# Patient Record
Sex: MALE | Race: BLACK OR AFRICAN AMERICAN | NOT HISPANIC OR LATINO
[De-identification: names, ages, dates, MRNs, and addresses within clinical notes are randomized per-mention and may not be internally consistent; named-entity substitution may affect disease eponyms.]

---

## 2018-04-18 ENCOUNTER — APPOINTMENT (OUTPATIENT)
Dept: ORTHOPEDIC SURGERY | Facility: CLINIC | Age: 68
End: 2018-04-18
Payer: MEDICARE

## 2018-04-18 VITALS — HEIGHT: 75 IN | BODY MASS INDEX: 26.86 KG/M2 | WEIGHT: 216 LBS | RESPIRATION RATE: 16 BRPM

## 2018-04-18 PROBLEM — Z00.00 ENCOUNTER FOR PREVENTIVE HEALTH EXAMINATION: Status: ACTIVE | Noted: 2018-04-18

## 2018-04-18 PROCEDURE — 76882 US LMTD JT/FCL EVL NVASC XTR: CPT | Mod: LT

## 2018-04-18 PROCEDURE — 99204 OFFICE O/P NEW MOD 45 MIN: CPT | Mod: 25

## 2018-04-26 ENCOUNTER — APPOINTMENT (OUTPATIENT)
Age: 68
End: 2018-04-26
Payer: MEDICARE

## 2018-04-26 PROCEDURE — 27654 REPAIR OF ACHILLES TENDON: CPT | Mod: LT

## 2018-05-04 ENCOUNTER — APPOINTMENT (OUTPATIENT)
Dept: ORTHOPEDIC SURGERY | Facility: CLINIC | Age: 68
End: 2018-05-04
Payer: MEDICARE

## 2018-05-04 VITALS — BODY MASS INDEX: 26.86 KG/M2 | HEIGHT: 75 IN | RESPIRATION RATE: 16 BRPM | WEIGHT: 216 LBS

## 2018-05-04 PROCEDURE — 99024 POSTOP FOLLOW-UP VISIT: CPT

## 2018-05-14 ENCOUNTER — APPOINTMENT (OUTPATIENT)
Dept: ORTHOPEDIC SURGERY | Facility: CLINIC | Age: 68
End: 2018-05-14
Payer: MEDICARE

## 2018-05-14 VITALS — BODY MASS INDEX: 26.86 KG/M2 | RESPIRATION RATE: 16 BRPM | WEIGHT: 216 LBS | HEIGHT: 75 IN

## 2018-05-14 PROCEDURE — 99024 POSTOP FOLLOW-UP VISIT: CPT

## 2018-05-14 RX ORDER — DOCUSATE SODIUM 100 MG/1
100 CAPSULE ORAL TWICE DAILY
Qty: 28 | Refills: 0 | Status: DISCONTINUED | COMMUNITY
Start: 2018-04-25 | End: 2018-05-14

## 2018-05-18 ENCOUNTER — APPOINTMENT (OUTPATIENT)
Dept: ORTHOPEDIC SURGERY | Facility: CLINIC | Age: 68
End: 2018-05-18
Payer: MEDICARE

## 2018-05-18 PROCEDURE — 99024 POSTOP FOLLOW-UP VISIT: CPT

## 2018-05-24 ENCOUNTER — LABORATORY RESULT (OUTPATIENT)
Age: 68
End: 2018-05-24

## 2018-05-25 ENCOUNTER — LABORATORY RESULT (OUTPATIENT)
Age: 68
End: 2018-05-25

## 2018-05-25 ENCOUNTER — APPOINTMENT (OUTPATIENT)
Dept: ORTHOPEDIC SURGERY | Facility: CLINIC | Age: 68
End: 2018-05-25
Payer: MEDICARE

## 2018-05-25 VITALS — HEIGHT: 75 IN | WEIGHT: 216 LBS | BODY MASS INDEX: 26.86 KG/M2 | RESPIRATION RATE: 16 BRPM

## 2018-05-25 PROCEDURE — 99024 POSTOP FOLLOW-UP VISIT: CPT

## 2018-05-30 ENCOUNTER — INPATIENT (INPATIENT)
Facility: HOSPITAL | Age: 68
LOS: 0 days | Discharge: HOME | DRG: 857 | End: 2018-05-31
Attending: ORTHOPAEDIC SURGERY | Admitting: ORTHOPAEDIC SURGERY
Payer: MEDICARE

## 2018-05-30 ENCOUNTER — APPOINTMENT (OUTPATIENT)
Dept: ORTHOPEDIC SURGERY | Facility: CLINIC | Age: 68
End: 2018-05-30
Payer: MEDICARE

## 2018-05-30 ENCOUNTER — APPOINTMENT (OUTPATIENT)
Dept: ORTHOPEDIC SURGERY | Facility: HOSPITAL | Age: 68
End: 2018-05-30

## 2018-05-30 VITALS
SYSTOLIC BLOOD PRESSURE: 122 MMHG | TEMPERATURE: 98 F | OXYGEN SATURATION: 99 % | HEART RATE: 59 BPM | DIASTOLIC BLOOD PRESSURE: 78 MMHG | RESPIRATION RATE: 16 BRPM

## 2018-05-30 VITALS — BODY MASS INDEX: 26.86 KG/M2 | HEIGHT: 75 IN | WEIGHT: 216 LBS | RESPIRATION RATE: 16 BRPM

## 2018-05-30 DIAGNOSIS — D64.9 ANEMIA, UNSPECIFIED: ICD-10-CM

## 2018-05-30 DIAGNOSIS — T81.4XXA INFECTION FOLLOWING A PROCEDURE, INITIAL ENCOUNTER: ICD-10-CM

## 2018-05-30 DIAGNOSIS — Z98.890 OTHER SPECIFIED POSTPROCEDURAL STATES: Chronic | ICD-10-CM

## 2018-05-30 DIAGNOSIS — Z01.818 ENCOUNTER FOR OTHER PREPROCEDURAL EXAMINATION: ICD-10-CM

## 2018-05-30 LAB
ALBUMIN SERPL ELPH-MCNC: 3.7 G/DL — SIGNIFICANT CHANGE UP (ref 3.4–5)
ALP SERPL-CCNC: 70 U/L — SIGNIFICANT CHANGE UP (ref 40–120)
ALT FLD-CCNC: 30 U/L — SIGNIFICANT CHANGE UP (ref 12–42)
ANION GAP SERPL CALC-SCNC: 10 MMOL/L — SIGNIFICANT CHANGE UP (ref 9–16)
APPEARANCE UR: CLEAR — SIGNIFICANT CHANGE UP
APTT BLD: 36.1 SEC — SIGNIFICANT CHANGE UP (ref 27.5–36.5)
AST SERPL-CCNC: 24 U/L — SIGNIFICANT CHANGE UP (ref 15–37)
BASOPHILS NFR BLD AUTO: 1.2 % — SIGNIFICANT CHANGE UP (ref 0–2)
BILIRUB SERPL-MCNC: 0.4 MG/DL — SIGNIFICANT CHANGE UP (ref 0.2–1.2)
BILIRUB UR-MCNC: NEGATIVE — SIGNIFICANT CHANGE UP
BLD GP AB SCN SERPL QL: NEGATIVE — SIGNIFICANT CHANGE UP
BUN SERPL-MCNC: 17 MG/DL — SIGNIFICANT CHANGE UP (ref 7–23)
CALCIUM SERPL-MCNC: 9.5 MG/DL — SIGNIFICANT CHANGE UP (ref 8.5–10.5)
CHLORIDE SERPL-SCNC: 106 MMOL/L — SIGNIFICANT CHANGE UP (ref 96–108)
CO2 SERPL-SCNC: 24 MMOL/L — SIGNIFICANT CHANGE UP (ref 22–31)
COLOR SPEC: YELLOW — SIGNIFICANT CHANGE UP
CREAT SERPL-MCNC: 1.3 MG/DL — SIGNIFICANT CHANGE UP (ref 0.5–1.3)
CRP SERPL-MCNC: 1 MG/DL — HIGH (ref 0–0.9)
DIFF PNL FLD: NEGATIVE — SIGNIFICANT CHANGE UP
EOSINOPHIL NFR BLD AUTO: 1 % — SIGNIFICANT CHANGE UP (ref 0–6)
GLUCOSE SERPL-MCNC: 104 MG/DL — HIGH (ref 70–99)
GLUCOSE UR QL: NEGATIVE — SIGNIFICANT CHANGE UP
HCT VFR BLD CALC: 36.4 % — LOW (ref 39–50)
HGB BLD-MCNC: 12.3 G/DL — LOW (ref 13–17)
IMM GRANULOCYTES NFR BLD AUTO: 0.6 % — SIGNIFICANT CHANGE UP (ref 0–1.5)
INR BLD: 1.15 — SIGNIFICANT CHANGE UP (ref 0.88–1.16)
KETONES UR-MCNC: NEGATIVE — SIGNIFICANT CHANGE UP
LEUKOCYTE ESTERASE UR-ACNC: NEGATIVE — SIGNIFICANT CHANGE UP
LYMPHOCYTES # BLD AUTO: 37.4 % — SIGNIFICANT CHANGE UP (ref 13–44)
MCHC RBC-ENTMCNC: 24.6 PG — LOW (ref 27–34)
MCHC RBC-ENTMCNC: 33.8 G/DL — SIGNIFICANT CHANGE UP (ref 32–36)
MCV RBC AUTO: 72.9 FL — LOW (ref 80–100)
MONOCYTES NFR BLD AUTO: 5.7 % — SIGNIFICANT CHANGE UP (ref 2–14)
NEUTROPHILS NFR BLD AUTO: 54.1 % — SIGNIFICANT CHANGE UP (ref 43–77)
NITRITE UR-MCNC: NEGATIVE — SIGNIFICANT CHANGE UP
PH UR: 6 — SIGNIFICANT CHANGE UP (ref 5–8)
PLATELET # BLD AUTO: 291 K/UL — SIGNIFICANT CHANGE UP (ref 150–400)
POTASSIUM SERPL-MCNC: 4.3 MMOL/L — SIGNIFICANT CHANGE UP (ref 3.5–5.3)
POTASSIUM SERPL-SCNC: 4.3 MMOL/L — SIGNIFICANT CHANGE UP (ref 3.5–5.3)
PROT SERPL-MCNC: 8 G/DL — SIGNIFICANT CHANGE UP (ref 6.4–8.2)
PROT UR-MCNC: NEGATIVE MG/DL — SIGNIFICANT CHANGE UP
PROTHROM AB SERPL-ACNC: 12.7 SEC — SIGNIFICANT CHANGE UP (ref 9.8–12.7)
RBC # BLD: 4.99 M/UL — SIGNIFICANT CHANGE UP (ref 4.2–5.8)
RBC # FLD: 13.6 % — SIGNIFICANT CHANGE UP (ref 10.3–16.9)
RH IG SCN BLD-IMP: POSITIVE — SIGNIFICANT CHANGE UP
SODIUM SERPL-SCNC: 140 MMOL/L — SIGNIFICANT CHANGE UP (ref 132–145)
SP GR SPEC: >=1.03 — SIGNIFICANT CHANGE UP (ref 1–1.03)
UROBILINOGEN FLD QL: 0.2 E.U./DL — SIGNIFICANT CHANGE UP
WBC # BLD: 6.7 K/UL — SIGNIFICANT CHANGE UP (ref 3.8–10.5)
WBC # FLD AUTO: 6.7 K/UL — SIGNIFICANT CHANGE UP (ref 3.8–10.5)

## 2018-05-30 PROCEDURE — 93010 ELECTROCARDIOGRAM REPORT: CPT

## 2018-05-30 PROCEDURE — 71045 X-RAY EXAM CHEST 1 VIEW: CPT | Mod: 26

## 2018-05-30 PROCEDURE — 10180 I&D COMPLEX PO WOUND INFCTJ: CPT | Mod: 78

## 2018-05-30 PROCEDURE — 99284 EMERGENCY DEPT VISIT MOD MDM: CPT

## 2018-05-30 PROCEDURE — 99221 1ST HOSP IP/OBS SF/LOW 40: CPT

## 2018-05-30 PROCEDURE — 97605 NEG PRS WND THER DME<=50SQCM: CPT

## 2018-05-30 PROCEDURE — 11043 DBRDMT MUSC&/FSCA 1ST 20/<: CPT | Mod: 78

## 2018-05-30 PROCEDURE — 99024 POSTOP FOLLOW-UP VISIT: CPT

## 2018-05-30 RX ORDER — DOCUSATE SODIUM 100 MG
100 CAPSULE ORAL THREE TIMES A DAY
Qty: 0 | Refills: 0 | Status: DISCONTINUED | OUTPATIENT
Start: 2018-05-30 | End: 2018-05-31

## 2018-05-30 RX ORDER — MORPHINE SULFATE 50 MG/1
4 CAPSULE, EXTENDED RELEASE ORAL EVERY 4 HOURS
Qty: 0 | Refills: 0 | Status: DISCONTINUED | OUTPATIENT
Start: 2018-05-30 | End: 2018-05-31

## 2018-05-30 RX ORDER — ACETAMINOPHEN 500 MG
650 TABLET ORAL EVERY 6 HOURS
Qty: 0 | Refills: 0 | Status: DISCONTINUED | OUTPATIENT
Start: 2018-05-30 | End: 2018-05-31

## 2018-05-30 RX ORDER — FAMOTIDINE 10 MG/ML
20 INJECTION INTRAVENOUS EVERY 12 HOURS
Qty: 0 | Refills: 0 | Status: DISCONTINUED | OUTPATIENT
Start: 2018-05-30 | End: 2018-05-31

## 2018-05-30 RX ORDER — VANCOMYCIN HCL 1 G
1500 VIAL (EA) INTRAVENOUS ONCE
Qty: 0 | Refills: 0 | Status: COMPLETED | OUTPATIENT
Start: 2018-05-31 | End: 2018-05-31

## 2018-05-30 RX ORDER — OXYCODONE HYDROCHLORIDE 5 MG/1
5 TABLET ORAL EVERY 4 HOURS
Qty: 0 | Refills: 0 | Status: DISCONTINUED | OUTPATIENT
Start: 2018-05-30 | End: 2018-05-31

## 2018-05-30 RX ORDER — MAGNESIUM HYDROXIDE 400 MG/1
30 TABLET, CHEWABLE ORAL DAILY
Qty: 0 | Refills: 0 | Status: DISCONTINUED | OUTPATIENT
Start: 2018-05-30 | End: 2018-05-31

## 2018-05-30 RX ORDER — SODIUM CHLORIDE 9 MG/ML
1000 INJECTION, SOLUTION INTRAVENOUS
Qty: 0 | Refills: 0 | Status: DISCONTINUED | OUTPATIENT
Start: 2018-05-30 | End: 2018-05-31

## 2018-05-30 RX ORDER — SODIUM CHLORIDE 9 MG/ML
1000 INJECTION INTRAMUSCULAR; INTRAVENOUS; SUBCUTANEOUS
Qty: 0 | Refills: 0 | Status: DISCONTINUED | OUTPATIENT
Start: 2018-05-30 | End: 2018-05-31

## 2018-05-30 RX ORDER — ASPIRIN/CALCIUM CARB/MAGNESIUM 324 MG
325 TABLET ORAL
Qty: 0 | Refills: 0 | Status: DISCONTINUED | OUTPATIENT
Start: 2018-05-30 | End: 2018-05-31

## 2018-05-30 RX ORDER — OXYCODONE HYDROCHLORIDE 5 MG/1
10 TABLET ORAL EVERY 4 HOURS
Qty: 0 | Refills: 0 | Status: DISCONTINUED | OUTPATIENT
Start: 2018-05-30 | End: 2018-05-31

## 2018-05-30 RX ORDER — METOCLOPRAMIDE HCL 10 MG
10 TABLET ORAL EVERY 6 HOURS
Qty: 0 | Refills: 0 | Status: DISCONTINUED | OUTPATIENT
Start: 2018-05-30 | End: 2018-05-31

## 2018-05-30 RX ORDER — ZALEPLON 10 MG
5 CAPSULE ORAL AT BEDTIME
Qty: 0 | Refills: 0 | Status: DISCONTINUED | OUTPATIENT
Start: 2018-05-30 | End: 2018-05-31

## 2018-05-30 RX ORDER — ONDANSETRON 8 MG/1
4 TABLET, FILM COATED ORAL EVERY 6 HOURS
Qty: 0 | Refills: 0 | Status: DISCONTINUED | OUTPATIENT
Start: 2018-05-30 | End: 2018-05-31

## 2018-05-30 RX ADMIN — MORPHINE SULFATE 4 MILLIGRAM(S): 50 CAPSULE, EXTENDED RELEASE ORAL at 22:32

## 2018-05-30 RX ADMIN — OXYCODONE HYDROCHLORIDE 5 MILLIGRAM(S): 5 TABLET ORAL at 21:13

## 2018-05-30 RX ADMIN — SODIUM CHLORIDE 100 MILLILITER(S): 9 INJECTION INTRAMUSCULAR; INTRAVENOUS; SUBCUTANEOUS at 20:57

## 2018-05-30 RX ADMIN — Medication 100 MILLIGRAM(S): at 22:10

## 2018-05-30 RX ADMIN — SODIUM CHLORIDE 100 MILLILITER(S): 9 INJECTION, SOLUTION INTRAVENOUS at 22:10

## 2018-05-30 RX ADMIN — MORPHINE SULFATE 4 MILLIGRAM(S): 50 CAPSULE, EXTENDED RELEASE ORAL at 22:44

## 2018-05-30 RX ADMIN — OXYCODONE HYDROCHLORIDE 5 MILLIGRAM(S): 5 TABLET ORAL at 22:11

## 2018-05-30 NOTE — H&P ADULT - NSHPPHYSICALEXAM_GEN_ALL_CORE
Gen: NAD, A&Ox3  MSK: Skin warm and well perfused; no erythema or excess warmth appreciated; proximal achilles repair surgical sites of posterior left lower extremity slightly macerated with scant serosanguinous drainage on overlying gauze. Distal heel nylon sutures in place. EHL/FHL/TA/GS 5/5 motor strength bilateral lower extremities. SLT in tact to distal left lower extremity; DP/PT pulses 2+.

## 2018-05-30 NOTE — ED PROVIDER NOTE - SKIN, MLM
2 surgical incisions noted in L posterior ankle, 2 surgical incisions noted in L posterior ankle, mild erythema, no suppuration, decreased rom due to pain.

## 2018-05-30 NOTE — ED PROVIDER NOTE - MEDICAL DECISION MAKING DETAILS
admission labs ordered including CRP, Sed rate as requested by Dr Dexter prior to admission. To be admitted to Dr Dexter's service for further work up given suspicion for surgical site infection. Hold IV abxs until seen by Ortho. Pt already took his bactrim dose today.

## 2018-05-30 NOTE — H&P ADULT - HISTORY OF PRESENT ILLNESS
67M no significant pmhx presents with left achilles tendon infection x 2 weeks. Patient had left achilles tendon repair outpatient approximately 4 weeks ago. Patient states he had his first post operative appointment approximately 2 weeks later and had not changed his dressing until he was seen that time - patient states he was told on this visit that he had a possible wound infection. Patient was given Bactrim x 2 weeks and outpatient cultures were taken resulting + MSSA. Patient has been ambulating painlessly and without assistance. He denies fevers and chills at home. He endorses some drainage from proximal surgical site. Denies numbness/tingling/weakness of bilateral lower extremities and denies pain. 67M no significant pmhx s/p achilles repair 4 weeks ago. Post operatively he developed cellulitis and was placed on Bactrim. He subsequently developed drainage and was recommended for urgent I&D on multiple occasions, with explanation that it is unlikely that this will resolve with isolated oral antibiotics. He continued to defer until re-evaluation on 5/30 in which he hand continued erythema, fibrinous drainage, pain, and evidence that his achilles repair was not intact with decreased resting tension of the gastroc soleous complex.  presents with left achilles tendon infection x 2 weeks. Patient had left achilles tendon repair outpatient approximately 4 weeks ago. Patient states he had his first post operative appointment approximately 2 weeks later and had not changed his dressing until he was seen that time - patient states he was told on this visit that he had a possible wound infection. Patient was given Bactrim x 2 weeks and outpatient cultures were taken resulting + MSSA. Patient has been ambulating painlessly and without assistance. He denies fevers and chills at home. He endorses some drainage from proximal surgical site. Denies numbness/tingling/weakness of bilateral lower extremities and denies pain. 67M no significant pmhx s/p achilles repair 4 weeks ago. Post operatively he developed cellulitis and was placed on Bactrim. He subsequently developed drainage and was recommended for urgent I&D on multiple occasions, with explanation that it is unlikely that this will resolve with isolated oral antibiotics. He continued to defer until re-evaluation on 5/30 in which he hand continued erythema, fibrinous drainage, pain, and evidence that his achilles repair was not intact with decreased resting tension of the gastroc soleous complex. A sterile field was prepared within the office and a culture of the drainage was performed which grew out MSSA. Extensive discussion of the risks and benifits was undertaken with the patient and his wife. + MSSA. He denies fevers and chills at home. He endorses some drainage from proximal surgical site. Denies numbness/tingling/weakness of bilateral lower extremities and denies pain.

## 2018-05-30 NOTE — ED PROVIDER NOTE - OBJECTIVE STATEMENT
67 male pt, no hx of med problems. Sent in by Orthopedics Dr Dexter from his office of admission for surgical site infection. Pt had achilles tendon surgery 4 weeks ago and has had suppuration for 2 weeks. Started on abxs w no improvement. Has completed 2 weeks of abxs (bactrim). denies fever, chills or any other symptoms.

## 2018-05-30 NOTE — CONSULT NOTE ADULT - SUBJECTIVE AND OBJECTIVE BOX
Patient is a 68 yo male no PMH who was admitted for I&D of left achilles. Patient stated he injured his left achilles tendon 4 weeks ago after hitting the back of his foot against the stair, seen in Urgent care was put on special boots. He saw Dr Martin, Orthopedic, the next day and underwent Left achilles tendon repair. Over the next 2 weeks, his surgical wound became more swollen and draining pus, he had his suture removed and started on Bactrim 2 weeks ago. However the wound never completely healed so he presented today for I&D. Patient stated he can still ambulate well, no pain with ambulation. No past medical history of cardiac, no kidney problem, no DM, no CVA. He is active and can walk up and down many stairs without problem.    Pt examined at bedside.   Denies fever/chill, no chest pain, no SOB, no pain anywhere else.    V/S    T(F): 97.7 (05-30-18 @ 17:22), Max: 98.1 (05-30-18 @ 12:58)  HR: 57 (05-30-18 @ 17:22)  BP: 145/82 (05-30-18 @ 17:22)  RR: 17 (05-30-18 @ 17:22)  SpO2: 100% (05-30-18 @ 17:22)  Wt(kg): --  PE     GEN - Appears stated age. No distress.           HEENT - NCAT, EOMI, PERRLA, MMM           CVS - RRR, no M/R/G, no JVD           PULM - CTA B/L, equal air entry, no increased work of breathing           ABD - Soft, nontender, nondistended, normal BS           EXT - Distal extremities warm, no cyanosis, no edema, open wound on posterior left heel, no drainage noted, some erythema.           NEURO - AOx3, Moves all extremities.     LAB                        12.3   6.7   )-----------( 291      ( 30 May 2018 11:21 )             36.4               05-30    140  |  106  |  17  ----------------------------<  104<H>  4.3   |  24  |  1.30    Ca    9.5      30 May 2018 11:21    TPro  8.0  /  Alb  3.7  /  TBili  0.4  /  DBili  x   /  AST  24  /  ALT  30  /  AlkPhos  70  05-30              PT/INR - ( 30 May 2018 11:21 )   PT: 12.7 sec;   INR: 1.15          PTT - ( 30 May 2018 11:21 )  PTT:36.1 sec            LIVER FUNCTIONS - ( 30 May 2018 11:21 )  Alb: 3.7 g/dL / Pro: 8.0 g/dL / ALK PHOS: 70 U/L / ALT: 30 U/L / AST: 24 U/L / GGT: x             Additional tests & radiology reviewed.

## 2018-05-30 NOTE — PROGRESS NOTE ADULT - SUBJECTIVE AND OBJECTIVE BOX
Ortho Post Op Check    Procedure: Achilles I&D LLE   Surgeon: Isacc     Pt comfortable without complaints, pain controlled  Denies CP, SOB, N/V, numbness/tingling     Vital Signs Last 24 Hrs  T(C): 35.7 (05-30-18 @ 22:24), Max: 36.1 (05-30-18 @ 20:25)  T(F): 96.2 (05-30-18 @ 22:24), Max: 97 (05-30-18 @ 20:25)  HR: 56 (05-30-18 @ 22:24) (52 - 56)  BP: 137/68 (05-30-18 @ 22:24) (125/76 - 143/81)  BP(mean): 116 (05-30-18 @ 22:00) (92 - 116)  RR: 16 (05-30-18 @ 22:24) (12 - 17)  SpO2: 99% (05-30-18 @ 22:24) (94% - 100%)  AVSS    General: Pt Alert and oriented, NAD  DSG C/D/I, short leg splint   Pulses: toes wwp   Sensation:  SILT sp/dp/t  Motor: +EHL/FHL                           12.3   6.7   )-----------( 291      ( 30 May 2018 11:21 )             36.4   30 May 2018 11:21    140    |  106    |  17     ----------------------------<  104    4.3     |  24     |  1.30       TPro  8.0    /  Alb  3.7    /  TBili  0.4    /  DBili  x      /  AST  24     /  ALT  30     /  AlkPhos  70     30 May 2018 11:21      A/P: 67yMale s/p L achilles I&D   - Stable  - Pain Control  - DVT ppx:  BID  - Post op abx: Vanco   - PT, WBS:  NWB LLE     Ortho Pager [phone]

## 2018-05-30 NOTE — CONSULT NOTE ADULT - PROBLEM SELECTOR RECOMMENDATION 3
Asymptomatic . Microcytic, consider Iron study. No active sign of bleeding. Coachristopher wnl.  Would have him follow up with PMD outpatient for further work up.

## 2018-05-30 NOTE — H&P ADULT - ASSESSMENT
67M with left achilles tendon surgical site infection 67M with left achilles tendon with post operative drainage concerning for deep infection.

## 2018-05-30 NOTE — BRIEF OPERATIVE NOTE - PROCEDURE
<<-----Click on this checkbox to enter Procedure Incision and drainage abscess  05/30/2018    [email or web address]  05/31/2018  Left achilles wound drainage, suture line abscess I&D  Active  [email or web address]  Negative pressure wound therapy  05/31/2018  Application of skin vac  Active  [email or web address]

## 2018-05-30 NOTE — PROGRESS NOTE ADULT - SUBJECTIVE AND OBJECTIVE BOX
Patient is a 67y old  Male who presents with a chief complaint of left ankle pain   Diagnosis: left achilles tendon infection  Procedure: left achilles tendon I&D  Surgeon: Dr. Dexter                           12.3   6.7   )-----------( 291      ( 30 May 2018 11:21 )             36.4     05-30    140  |  106  |  17  ----------------------------<  104<H>  4.3   |  24  |  1.30    Ca    9.5      30 May 2018 11:21    TPro  8.0  /  Alb  3.7  /  TBili  0.4  /  DBili  x   /  AST  24  /  ALT  30  /  AlkPhos  70  05-30    PT/INR - ( 30 May 2018 11:21 )   PT: 12.7 sec;   INR: 1.15          PTT - ( 30 May 2018 11:21 )  PTT:36.1 sec      [ ] Type & Screen  [x ] CBC  [x ] BMP  [x ] PT/PTT/INR  [ ] Urinalysis  [ ] Chest X-ray  [ ] EKG  [x ] NPO/IVF  [x ] Consent  [ ] Clearance  [x ] Added on to OR Schedule  [ ] Anti-coagulation held    Assessment & Plan: 67yoMale with left achilles tendon infection  -For OR 5/30/18 left achilles tendon I&D

## 2018-05-30 NOTE — ED PROVIDER NOTE - MUSCULOSKELETAL, MLM
L ankle immobilization in place, removed for skin assessment, decreased rom due to pain, normal distal sensation and color (cap refill).

## 2018-05-30 NOTE — CONSULT NOTE ADULT - PROBLEM SELECTOR RECOMMENDATION 2
Mets >4  RCRI 0, low cardiac risk for low risk procedure.  EKG showed no ischemic changes.  No reaction to opioid in the past, if uses opiod post op consider bowel regiment.  No reaction to anesthesia in the past.  He is medically optimized for procedure.

## 2018-05-30 NOTE — BRIEF OPERATIVE NOTE - OPERATION/FINDINGS
op note for full findings; briefly, suture line abscess, necrosis and failure achilles fixation, no obvious purulence

## 2018-05-30 NOTE — H&P ADULT - PROBLEM SELECTOR PLAN 1
Admit to orthopaedic service  Medical clearance pending per Med consult  NPO/IVF  Added to OR schedule for left achilles I&D  Hold antibiotics until OR; f/u cultures  Follow up ID consult

## 2018-05-31 ENCOUNTER — TRANSCRIPTION ENCOUNTER (OUTPATIENT)
Age: 68
End: 2018-05-31

## 2018-05-31 VITALS
OXYGEN SATURATION: 96 % | TEMPERATURE: 98 F | SYSTOLIC BLOOD PRESSURE: 109 MMHG | DIASTOLIC BLOOD PRESSURE: 67 MMHG | RESPIRATION RATE: 16 BRPM | HEART RATE: 63 BPM

## 2018-05-31 LAB
ANION GAP SERPL CALC-SCNC: 12 MMOL/L — SIGNIFICANT CHANGE UP (ref 5–17)
BASOPHILS NFR BLD AUTO: 0.3 % — SIGNIFICANT CHANGE UP (ref 0–2)
BUN SERPL-MCNC: 16 MG/DL — SIGNIFICANT CHANGE UP (ref 7–23)
CALCIUM SERPL-MCNC: 8.9 MG/DL — SIGNIFICANT CHANGE UP (ref 8.4–10.5)
CHLORIDE SERPL-SCNC: 102 MMOL/L — SIGNIFICANT CHANGE UP (ref 96–108)
CO2 SERPL-SCNC: 21 MMOL/L — LOW (ref 22–31)
CREAT SERPL-MCNC: 1.15 MG/DL — SIGNIFICANT CHANGE UP (ref 0.5–1.3)
EOSINOPHIL NFR BLD AUTO: 0.3 % — SIGNIFICANT CHANGE UP (ref 0–6)
ERYTHROCYTE [SEDIMENTATION RATE] IN BLOOD: 26 MM/HR — HIGH (ref 0–20)
GLUCOSE SERPL-MCNC: 114 MG/DL — HIGH (ref 70–99)
HCT VFR BLD CALC: 31.9 % — LOW (ref 39–50)
HGB BLD-MCNC: 10.9 G/DL — LOW (ref 13–17)
LYMPHOCYTES # BLD AUTO: 30.8 % — SIGNIFICANT CHANGE UP (ref 13–44)
MCHC RBC-ENTMCNC: 24.2 PG — LOW (ref 27–34)
MCHC RBC-ENTMCNC: 34.2 G/DL — SIGNIFICANT CHANGE UP (ref 32–36)
MCV RBC AUTO: 70.7 FL — LOW (ref 80–100)
MONOCYTES NFR BLD AUTO: 5.3 % — SIGNIFICANT CHANGE UP (ref 2–14)
NEUTROPHILS NFR BLD AUTO: 63.3 % — SIGNIFICANT CHANGE UP (ref 43–77)
PLATELET # BLD AUTO: 271 K/UL — SIGNIFICANT CHANGE UP (ref 150–400)
POTASSIUM SERPL-MCNC: 4.4 MMOL/L — SIGNIFICANT CHANGE UP (ref 3.5–5.3)
POTASSIUM SERPL-SCNC: 4.4 MMOL/L — SIGNIFICANT CHANGE UP (ref 3.5–5.3)
RBC # BLD: 4.51 M/UL — SIGNIFICANT CHANGE UP (ref 4.2–5.8)
RBC # FLD: 13.7 % — SIGNIFICANT CHANGE UP (ref 10.3–16.9)
SODIUM SERPL-SCNC: 135 MMOL/L — SIGNIFICANT CHANGE UP (ref 135–145)
WBC # BLD: 6.7 K/UL — SIGNIFICANT CHANGE UP (ref 3.8–10.5)
WBC # FLD AUTO: 6.7 K/UL — SIGNIFICANT CHANGE UP (ref 3.8–10.5)

## 2018-05-31 PROCEDURE — 86850 RBC ANTIBODY SCREEN: CPT

## 2018-05-31 PROCEDURE — 87086 URINE CULTURE/COLONY COUNT: CPT

## 2018-05-31 PROCEDURE — 87075 CULTR BACTERIA EXCEPT BLOOD: CPT

## 2018-05-31 PROCEDURE — 85652 RBC SED RATE AUTOMATED: CPT

## 2018-05-31 PROCEDURE — 86900 BLOOD TYPING SEROLOGIC ABO: CPT

## 2018-05-31 PROCEDURE — 87070 CULTURE OTHR SPECIMN AEROBIC: CPT

## 2018-05-31 PROCEDURE — 80048 BASIC METABOLIC PNL TOTAL CA: CPT

## 2018-05-31 PROCEDURE — 85610 PROTHROMBIN TIME: CPT

## 2018-05-31 PROCEDURE — 80053 COMPREHEN METABOLIC PANEL: CPT

## 2018-05-31 PROCEDURE — 99232 SBSQ HOSP IP/OBS MODERATE 35: CPT

## 2018-05-31 PROCEDURE — 36415 COLL VENOUS BLD VENIPUNCTURE: CPT

## 2018-05-31 PROCEDURE — 81003 URINALYSIS AUTO W/O SCOPE: CPT

## 2018-05-31 PROCEDURE — 71045 X-RAY EXAM CHEST 1 VIEW: CPT

## 2018-05-31 PROCEDURE — 99222 1ST HOSP IP/OBS MODERATE 55: CPT | Mod: GC

## 2018-05-31 PROCEDURE — 86140 C-REACTIVE PROTEIN: CPT

## 2018-05-31 PROCEDURE — 93005 ELECTROCARDIOGRAM TRACING: CPT

## 2018-05-31 PROCEDURE — 85025 COMPLETE CBC W/AUTO DIFF WBC: CPT

## 2018-05-31 PROCEDURE — 97161 PT EVAL LOW COMPLEX 20 MIN: CPT

## 2018-05-31 PROCEDURE — 99285 EMERGENCY DEPT VISIT HI MDM: CPT | Mod: 25

## 2018-05-31 PROCEDURE — 87186 SC STD MICRODIL/AGAR DIL: CPT

## 2018-05-31 PROCEDURE — 85730 THROMBOPLASTIN TIME PARTIAL: CPT

## 2018-05-31 PROCEDURE — 86901 BLOOD TYPING SEROLOGIC RH(D): CPT

## 2018-05-31 RX ORDER — CEFAZOLIN SODIUM 1 G
1000 VIAL (EA) INJECTION EVERY 8 HOURS
Qty: 0 | Refills: 0 | Status: DISCONTINUED | OUTPATIENT
Start: 2018-05-31 | End: 2018-05-31

## 2018-05-31 RX ORDER — VANCOMYCIN HCL 1 G
1500 VIAL (EA) INTRAVENOUS ONCE
Qty: 0 | Refills: 0 | Status: DISCONTINUED | OUTPATIENT
Start: 2018-05-31 | End: 2018-05-31

## 2018-05-31 RX ORDER — OXYCODONE HYDROCHLORIDE 5 MG/1
1 TABLET ORAL
Qty: 0 | Refills: 0 | DISCHARGE
Start: 2018-05-31

## 2018-05-31 RX ORDER — DOCUSATE SODIUM 100 MG
1 CAPSULE ORAL
Qty: 0 | Refills: 0 | DISCHARGE
Start: 2018-05-31

## 2018-05-31 RX ORDER — POLYETHYLENE GLYCOL 3350 17 G/17G
17 POWDER, FOR SOLUTION ORAL
Qty: 0 | Refills: 0 | DISCHARGE
Start: 2018-05-31

## 2018-05-31 RX ORDER — ACETAMINOPHEN 500 MG
2 TABLET ORAL
Qty: 0 | Refills: 0 | DISCHARGE
Start: 2018-05-31

## 2018-05-31 RX ORDER — ASPIRIN/CALCIUM CARB/MAGNESIUM 324 MG
1 TABLET ORAL
Qty: 0 | Refills: 0 | DISCHARGE
Start: 2018-05-31

## 2018-05-31 RX ORDER — POLYETHYLENE GLYCOL 3350 17 G/17G
17 POWDER, FOR SOLUTION ORAL DAILY
Qty: 0 | Refills: 0 | Status: DISCONTINUED | OUTPATIENT
Start: 2018-05-31 | End: 2018-05-31

## 2018-05-31 RX ADMIN — FAMOTIDINE 20 MILLIGRAM(S): 10 INJECTION INTRAVENOUS at 06:02

## 2018-05-31 RX ADMIN — Medication 100 MILLIGRAM(S): at 11:12

## 2018-05-31 RX ADMIN — Medication 100 MILLIGRAM(S): at 06:02

## 2018-05-31 RX ADMIN — OXYCODONE HYDROCHLORIDE 10 MILLIGRAM(S): 5 TABLET ORAL at 01:07

## 2018-05-31 RX ADMIN — Medication 325 MILLIGRAM(S): at 06:02

## 2018-05-31 RX ADMIN — Medication 5 MILLIGRAM(S): at 11:12

## 2018-05-31 RX ADMIN — OXYCODONE HYDROCHLORIDE 10 MILLIGRAM(S): 5 TABLET ORAL at 06:17

## 2018-05-31 RX ADMIN — OXYCODONE HYDROCHLORIDE 10 MILLIGRAM(S): 5 TABLET ORAL at 14:59

## 2018-05-31 RX ADMIN — POLYETHYLENE GLYCOL 3350 17 GRAM(S): 17 POWDER, FOR SOLUTION ORAL at 11:12

## 2018-05-31 RX ADMIN — OXYCODONE HYDROCHLORIDE 10 MILLIGRAM(S): 5 TABLET ORAL at 15:41

## 2018-05-31 RX ADMIN — Medication 100 MILLIGRAM(S): at 12:48

## 2018-05-31 RX ADMIN — OXYCODONE HYDROCHLORIDE 10 MILLIGRAM(S): 5 TABLET ORAL at 05:15

## 2018-05-31 RX ADMIN — OXYCODONE HYDROCHLORIDE 10 MILLIGRAM(S): 5 TABLET ORAL at 02:07

## 2018-05-31 RX ADMIN — Medication 300 MILLIGRAM(S): at 07:13

## 2018-05-31 NOTE — CONSULT NOTE ADULT - SUBJECTIVE AND OBJECTIVE BOX
HPI:  67M no significant pmhx s/p achilles repair 4 weeks ago. Post operatively he developed cellulitis and was placed on Bactrim. He subsequently developed drainage and was recommended for urgent I&D on multiple occasions, with explanation that it is unlikely that this will resolve with isolated oral antibiotics. He continued to defer until re-evaluation on 5/30 in which he hand continued erythema, fibrinous drainage, pain, and evidence that his achilles repair was not intact with decreased resting tension of the gastroc soleous complex. A sterile field was prepared within the office and a culture of the drainage was performed which grew out MSSA. Extensive discussion of the risks and benefits was undertaken with the patient and his wife. + MSSA. He denies fevers and chills at home. He endorses some drainage from proximal surgical site. Denies numbness/tingling/weakness of bilateral lower extremities and denies pain. (30 May 2018 17:22)    Interval Hx: No acute events overnight. Pt is seen and examined bedside. Pt has no complaints, states the surgery went well and that he is feeling fine. Denies fever, chills, nausea, emesis, chest pain, dyspnea, abdominal pain.       PAST MEDICAL & SURGICAL HISTORY:  No significant past medical history  S/P Achilles tendon repair: left    PAST SOCIAL HISTORY:  denies use of tobacco and illicit drugs, ETOH occasionally     REVIEW OF SYSTEMS:    General:	 no weakness; no fevers, no chills  Skin/Breast: no rash  Respiratory and Thorax: no SOB, no cough  Cardiovascular:	No chest pain  Gastrointestinal:	 no nausea, vomiting , diarrhea  Genitourinary:	no dysuria, no difficulty urinating, no hematuria  Musculoskeletal:	no weakness, no joint swelling/pain  Neurological:	no focal weakness/numbness  Endocrine:	no polyuria, no polydipsia      ANTIBIOTICS:  MEDICATIONS  (STANDING):  aspirin 325 milliGRAM(s) Oral two times a day  docusate sodium 100 milliGRAM(s) Oral three times a day  famotidine    Tablet 20 milliGRAM(s) Oral every 12 hours  polyethylene glycol 3350 17 Gram(s) Oral daily  vancomycin  IVPB 1500 milliGRAM(s) IV Intermittent once    MEDICATIONS  (PRN):  acetaminophen   Tablet 650 milliGRAM(s) Oral every 6 hours PRN For Temp greater than 38 C (100.4 F)  acetaminophen   Tablet. 650 milliGRAM(s) Oral every 6 hours PRN Mild Pain (1 - 3)  bisacodyl 5 milliGRAM(s) Oral every 12 hours PRN Constipation  magnesium hydroxide Suspension 30 milliLiter(s) Oral daily PRN Constipation  metoclopramide Injectable 10 milliGRAM(s) IV Push every 6 hours PRN Nausea and/or Vomiting  morphine  - Injectable 4 milliGRAM(s) IV Push every 4 hours PRN BREAKTHROUGH PAIN  ondansetron Injectable 4 milliGRAM(s) IV Push every 6 hours PRN Nausea and/or Vomiting  oxyCODONE    IR 10 milliGRAM(s) Oral every 4 hours PRN Severe Pain (7 - 10)  oxyCODONE    IR 5 milliGRAM(s) Oral every 4 hours PRN Moderate Pain (4 - 6)  zaleplon 5 milliGRAM(s) Oral at bedtime PRN Insomnia      Allergies    No Known Allergies    Intolerances            FAMILY HISTORY:  No pertinent family history in first degree relatives      Vital Signs Last 24 Hrs  T(C): 36.4 (31 May 2018 09:05), Max: 36.7 (30 May 2018 12:58)  T(F): 97.6 (31 May 2018 09:05), Max: 98.1 (30 May 2018 12:58)  HR: 65 (31 May 2018 09:05) (50 - 65)  BP: 120/75 (31 May 2018 09:05) (109/67 - 145/82)  BP(mean): 116 (30 May 2018 22:00) (92 - 116)  RR: 16 (31 May 2018 09:05) (12 - 17)  SpO2: 95% (31 May 2018 09:05) (94% - 100%)    05-30-18 @ 07:01  -  05-31-18 @ 07:00  --------------------------------------------------------  IN: 1520 mL / OUT: 670 mL / NET: 850 mL    05-31-18 @ 07:01  -  05-31-18 @ 12:08  --------------------------------------------------------  IN: 480 mL / OUT: 0 mL / NET: 480 mL        PHYSICAL EXAM:  Constitutional:Well-developed, well nourished  Eyes:PETRA, EOMI  Ear/Nose/Throat: no oral lesion, no sinus tenderness on percussion	  Neck:no JVD, no lymphadenopathy, supple  Respiratory: CTA radha  Cardiovascular: S1S2 RRR, no murmurs  Gastrointestinal:soft, (+) BS, no HSM  Extremities: Posterior splint on the left leg c/d/i. wound vac operating properly   Vascular: DP Pulse:	right normal; left normal            LABS:                        10.9   6.7   )-----------( 271      ( 31 May 2018 06:20 )             31.9     05-31    135  |  102  |  16  ----------------------------<  114<H>  4.4   |  21<L>  |  1.15    Ca    8.9      31 May 2018 06:20    TPro  8.0  /  Alb  3.7  /  TBili  0.4  /  DBili  x   /  AST  24  /  ALT  30  /  AlkPhos  70  05-30    PT/INR - ( 30 May 2018 11:21 )   PT: 12.7 sec;   INR: 1.15          PTT - ( 30 May 2018 11:21 )  PTT:36.1 sec  Urinalysis Basic - ( 30 May 2018 18:11 )    Color: Yellow / Appearance: Clear / SG: >=1.030 / pH: x  Gluc: x / Ketone: NEGATIVE  / Bili: Negative / Urobili: 0.2 E.U./dL   Blood: x / Protein: NEGATIVE mg/dL / Nitrite: NEGATIVE   Leuk Esterase: NEGATIVE / RBC: x / WBC x   Sq Epi: x / Non Sq Epi: x / Bacteria: x        MICROBIOLOGY:     Culture from Dr. Dexter's office in late May grew Co-ag negative Staph sensitive to Doxycycline   RADIOLOGY & ADDITIONAL STUDIES:

## 2018-05-31 NOTE — DISCHARGE NOTE ADULT - REASON FOR ADMISSION
left achilles tendon infection/ left achilles tendon irrigation and drainage, placement of prevena incisional drain 5/30/18

## 2018-05-31 NOTE — CONSULT NOTE ADULT - ASSESSMENT
67M with left achilles tendon with post operative drainage concerning for deep infection. POD #1 Left achilles I & D.     Recommendations:  1. Discontinue Vancomycin   2. Please start Doxycycline 100mg po q 12 for a total duration of 7 days from OR I&D  (stop date: 6/6/18)  3. Pt will f/u with Dr. Dexter as an out-patient on Wednesday     ID to sign-off, please re-consult if additional questions

## 2018-05-31 NOTE — CONSULT NOTE ADULT - ATTENDING COMMENTS
Briefly, 68 yo male who underwent L Achilles tendon repair 4 weeks ago, subsequent development of SSI 2 weeks later (received a course of TMP/SMX without significant improvement), who underwent I&D this week (outside cx grew CoNS (S TMP/SMX, S tetracycline)); he had a suture line abscess that was drained intra-operatively yesterday. Photos of wound  prior to debridement on 5/30 reviewed--dehiscence at site and slight purulence noted. 1. f/u OR cx until finalized--so far ngtd. 2. start doxycycline 100mg PO s17b--jrycy treat for at least 7 days; at outpatient orthopedics appointment on Wednesday, may opt to extend course to 10 days if there is still residual inflammation.     Please reconsult with ?  To see again as requested
Pt seen and examined yesterday at 5:15 pm, agree with assessment and plan above per Dr Underwood.

## 2018-05-31 NOTE — PROGRESS NOTE ADULT - PROBLEM SELECTOR PLAN 1
S/p I&D, patient is doing well, no sign if systemic infection.  Patient to be discharged with Bactrim and to follow up with ID, Dr Marsh, outpatient.

## 2018-05-31 NOTE — DISCHARGE NOTE ADULT - MEDICATION SUMMARY - MEDICATIONS TO TAKE
I will START or STAY ON the medications listed below when I get home from the hospital:    aspirin 325 mg oral tablet  -- 1 tab(s) by mouth 2 times a day  Take for 4 weeks after surgery to prevent blood clots  -- Indication: For Prevent blood clots    acetaminophen 325 mg oral tablet  -- 2 tab(s) by mouth every 6 hours, As needed, For Temp greater than 38 C (100.4 F) or mild pain (1-3).   Do not take more than 3,250mg in a day.  Do not take in the same 4 hours as oxycodone/acetaminophen  -- Indication: For mild pain, fever    oxyCODONE 5 mg oral tablet  -- 1 tab(s) by mouth every 4 hours, As needed, Moderate Pain (4 - 6).  You have prescription from Dr. Dexter at home  -- Indication: For moderate pain    docusate sodium 100 mg oral capsule  -- 1 cap(s) by mouth 3 times a day  -- Indication: For constipation    bisacodyl 10 mg rectal suppository  -- 1 suppository(ies) rectally once a day, As needed, If no bowel movement  -- Indication: For constipation    polyethylene glycol 3350 oral powder for reconstitution  -- 17 gram(s) by mouth once a day  -- Indication: For constipation    bisacodyl 5 mg oral delayed release tablet  -- 1 tab(s) by mouth every 12 hours, As needed, Constipation  -- Indication: For constipation I will START or STAY ON the medications listed below when I get home from the hospital:    aspirin 325 mg oral tablet  -- 1 tab(s) by mouth 2 times a day  Take for 4 weeks after surgery to prevent blood clots  -- Indication: For Prevent blood clots    acetaminophen 325 mg oral tablet  -- 2 tab(s) by mouth every 6 hours, As needed, For Temp greater than 38 C (100.4 F) or mild pain (1-3).   Do not take more than 3,250mg in a day.  Do not take in the same 4 hours as oxycodone/acetaminophen  -- Indication: For mild pain, fever    oxyCODONE 5 mg oral tablet  -- 1 tab(s) by mouth every 4 hours, As needed, Moderate Pain (4 - 6).  You have prescription from Dr. Dexter at home  -- Indication: For moderate pain    doxycycline hyclate 100 mg oral tablet  -- 1 tab(s) by mouth 2 times a day   -- Avoid prolonged or excessive exposure to direct and/or artificial sunlight while taking this medication.  Do not take this drug if you are pregnant.  Finish all this medication unless otherwise directed by prescriber.  Medication should be taken with plenty of water.    -- Indication: For coag negative staph infection left achilles tendon    docusate sodium 100 mg oral capsule  -- 1 cap(s) by mouth 3 times a day  -- Indication: For constipation    bisacodyl 10 mg rectal suppository  -- 1 suppository(ies) rectally once a day, As needed, If no bowel movement  -- Indication: For constipation    polyethylene glycol 3350 oral powder for reconstitution  -- 17 gram(s) by mouth once a day  -- Indication: For constipation    bisacodyl 5 mg oral delayed release tablet  -- 1 tab(s) by mouth every 12 hours, As needed, Constipation  -- Indication: For constipation

## 2018-05-31 NOTE — PROGRESS NOTE ADULT - PROBLEM SELECTOR PLAN 2
10.2 today, microcytic, patient is asymptomatic. No history of anemia. No active sign of bleeding.  C-scope last year normal per patient.  He would warrant follow up with his PMD for further work up and repeat CBC within 2 weeks. Patient is informed of the important of follow up.

## 2018-05-31 NOTE — DISCHARGE NOTE ADULT - CARE PLAN
Principal Discharge DX:	Postoperative wound infection, initial encounter  Goal:	Decreased risk of infection after left achilles I&D  Assessment and plan of treatment:	Non-weight bearing on left leg with rolling walker  Keep left lower extremity splint clean, dry and intact.  Sponge bathe only  You have a prevena incisional drain to promote wound closure.  Dr. Dexter will remove the incisional dressing at the follow up appointment Wednesday, 6/6/18  No strenuous activity, heavy lifting, driving, tub bathing, or returning to work until cleared by MD.  If you don't have a bowel movement by post op day 3, then take Milk of Magnesia (over the counter).  If no bowel movement by at least post op day 5, then use a Dulcolax suppository (over the counter) and/or a Fleets enema--if still no bowel movement, call your MD.  Contact your doctor if you experience: fever greater than 101.5, chills, chest pain, difficulty breathing, bleeding, redness or heat around the incision.  You are followed by infectious disease Dr. Marsh.  Continue bactrim DS two times daily.  You may take a probiotic or Activia yogurt while on antibiotics for digestive health Principal Discharge DX:	Postoperative wound infection, initial encounter  Goal:	Decreased risk of infection after left achilles I&D  Assessment and plan of treatment:	Non-weight bearing on left leg with rolling walker  Keep left lower extremity splint clean, dry and intact.  Sponge bathe only  You have a prevena incisional drain to promote wound closure.  Dr. Dexter will remove the incisional dressing at the follow up appointment Wednesday, 6/6/18  No strenuous activity, heavy lifting, driving, tub bathing, or returning to work until cleared by MD.  If you don't have a bowel movement by post op day 3, then take Milk of Magnesia (over the counter).  If no bowel movement by at least post op day 5, then use a Dulcolax suppository (over the counter) and/or a Fleets enema--if still no bowel movement, call your MD.  Contact your doctor if you experience: fever greater than 101.5, chills, chest pain, difficulty breathing, bleeding, redness or heat around the incision.  You are followed by infectious disease Dr. Marsh for coag negative staph infection in left achilles tendon.  Take doxycycline 100mg table two times daily for 7 days.  You may take a probiotic or Activia yogurt while on antibiotics for digestive health

## 2018-05-31 NOTE — DISCHARGE NOTE ADULT - CARE PROVIDER_API CALL
Finn Dexter), [facility]  Orthopedics  [address]  [address] [place] [address]  Phone: [phone]  Fax: [phone] Finn Dexter), [facility]  Orthopedics  [address]  [address] [place] [address]  Phone: [phone]  Fax: [phone]    Christine Marsh), Internal Medicine  [address]  [address] [place] [address]  Phone: [phone]  Fax: [phone]

## 2018-05-31 NOTE — DISCHARGE NOTE ADULT - PLAN OF CARE
Decreased risk of infection after left achilles I&D Non-weight bearing on left leg with rolling walker  Keep left lower extremity splint clean, dry and intact.  Sponge bathe only  You have a prevena incisional drain to promote wound closure.  Dr. Dexter will remove the incisional dressing at the follow up appointment Wednesday, 6/6/18  No strenuous activity, heavy lifting, driving, tub bathing, or returning to work until cleared by MD.  If you don't have a bowel movement by post op day 3, then take Milk of Magnesia (over the counter).  If no bowel movement by at least post op day 5, then use a Dulcolax suppository (over the counter) and/or a Fleets enema--if still no bowel movement, call your MD.  Contact your doctor if you experience: fever greater than 101.5, chills, chest pain, difficulty breathing, bleeding, redness or heat around the incision.  You are followed by infectious disease Dr. Marsh.  Continue bactrim DS two times daily.  You may take a probiotic or Activia yogurt while on antibiotics for digestive health Non-weight bearing on left leg with rolling walker  Keep left lower extremity splint clean, dry and intact.  Sponge bathe only  You have a prevena incisional drain to promote wound closure.  Dr. Dexter will remove the incisional dressing at the follow up appointment Wednesday, 6/6/18  No strenuous activity, heavy lifting, driving, tub bathing, or returning to work until cleared by MD.  If you don't have a bowel movement by post op day 3, then take Milk of Magnesia (over the counter).  If no bowel movement by at least post op day 5, then use a Dulcolax suppository (over the counter) and/or a Fleets enema--if still no bowel movement, call your MD.  Contact your doctor if you experience: fever greater than 101.5, chills, chest pain, difficulty breathing, bleeding, redness or heat around the incision.  You are followed by infectious disease Dr. Marsh for coag negative staph infection in left achilles tendon.  Take doxycycline 100mg table two times daily for 7 days.  You may take a probiotic or Activia yogurt while on antibiotics for digestive health

## 2018-05-31 NOTE — PHYSICAL THERAPY INITIAL EVALUATION ADULT - DID THE PATIENT HAVE SURGERY?
left achilles tendon infection/ left achilles tendon irrigation and drainage, placement of prevena incisional drain/yes

## 2018-05-31 NOTE — DISCHARGE NOTE ADULT - PATIENT PORTAL LINK FT
You can access the [facility] Patient Portal, offered by [facility], by registering with the following website: [email or web address]

## 2018-05-31 NOTE — PHYSICAL THERAPY INITIAL EVALUATION ADULT - ADDITIONAL COMMENTS
Pt lives w/ wife and children in elevator access apt building, no stairs to enter. Prior to this admission pt was using a RW for ambulation 2/2 NWB status from achilles tendon rupture, states that he had recently progressed to amb w/ boot and RW. Denies hx of recent falls, no HHA, no HPT

## 2018-05-31 NOTE — PROGRESS NOTE ADULT - SUBJECTIVE AND OBJECTIVE BOX
Pt examined at bedside.   Denies pain, no fever/chill.    V/S    T(F): 97.6 (05-31-18 @ 09:05), Max: 98.1 (05-30-18 @ 12:58)  HR: 65 (05-31-18 @ 09:05)  BP: 120/75 (05-31-18 @ 09:05)  RR: 16 (05-31-18 @ 09:05)  SpO2: 95% (05-31-18 @ 09:05)  Wt(kg): --  PE     GEN - Appears stated age. No distress.           HEENT - NCAT, EOMI, PERRLA, MMM           CVS - RRR, no M/R/G, no JVD           PULM - CTA B/L, equal air entry, no increased work of breathing           ABD - Soft, nontender, nondistended, normal BS           EXT - Distal extremities warm, no cyanosis, no edema, dressing intact on left heel.           NEURO - AOx3, Moves all extremities.     LAB                        10.9   6.7   )-----------( 271      ( 31 May 2018 06:20 )             31.9               05-31    135  |  102  |  16  ----------------------------<  114<H>  4.4   |  21<L>  |  1.15    Ca    8.9      31 May 2018 06:20    TPro  8.0  /  Alb  3.7  /  TBili  0.4  /  DBili  x   /  AST  24  /  ALT  30  /  AlkPhos  70  05-30              PT/INR - ( 30 May 2018 11:21 )   PT: 12.7 sec;   INR: 1.15          PTT - ( 30 May 2018 11:21 )  PTT:36.1 sec            LIVER FUNCTIONS - ( 30 May 2018 11:21 )  Alb: 3.7 g/dL / Pro: 8.0 g/dL / ALK PHOS: 70 U/L / ALT: 30 U/L / AST: 24 U/L / GGT: x             Additional tests & radiology reviewed.

## 2018-05-31 NOTE — PROGRESS NOTE ADULT - ASSESSMENT
67-year-old male with postoperative coag-negative staph infection of his left Achilles tendon repair, with failure of fixation. He underwent Achilles tendon debridement excisional debridement of Achilles tendon necrotic tissue, skin and subdermal tissue debridement, removal of all Vicryl suture, application of skin negative pressure dressing. Extensive discussion was explained to the patient about treatment algorithm. This includes possible repeat I&D. He will likely need a revision surgery down the road depending on his functional outcome with FHL tendon transfer. At this time, his soft tissue bed is not amenable to further intervention  #1 Follow up next Wednesday, June 6 for wound check. No images images at follow up  #2 strict nonweightbearing left lower extremity  #3 aspirin 325 mg p.o. b.i.d. for DVT prophylaxis  #4 ice and elevate  #5 physical therapy evaluation with roller walker  #6 plan for discharge today after infectious disease recommendations  #7 followup cultures from debridement

## 2018-05-31 NOTE — PROGRESS NOTE ADULT - SUBJECTIVE AND OBJECTIVE BOX
Patient seen and examined this morning. No acute events overnight.    Outpatient cultures resulted as coag negative staphylococcus susceptible to Bactrim. It is resistant to Ancef, Augmentin, oxacillin, penicillin, clindamycin, erythromycin.    General: Patient is awake and alert, demonstrates appropriate mood and affect, exhibits normal breathing and is in no acute distress.  Psych:  The patient is appropriately dressed and groomed, maintains good eye contact.  Alert and oriented x 3.  Normal attention/concentration  Skin: The patient has no chronic skin lesions, rashes, or ulcers.  There is no induration or erythema of uninvolved extremities.  For skin exam of involved extremity refer to detailed musculoskeletal/extremity exam.   Respiratory: The patient is in no apparent respiratory distress. They're taking full deep breaths with normal excursion, without use of accessory muscles or evidence of audible wheezes or stridor without the use of a stethoscope.   Neurological: 5/5 motor strength and sensation intact throughout uninvolved upper and lower extremities, refer to detailed musculoskeletal exam regarding involved extremity.    Left lower extremity  The patient's splint is intact. He is in resting equinus position. His back is intact with no significant drainage or collection. He is able to wiggle his expose the digits both plantarflexion and dorsiflexion. His foot is warm and well-perfused with brisk capillary refill of all 5 digits.

## 2018-05-31 NOTE — DISCHARGE NOTE ADULT - HOSPITAL COURSE
Admit 5/30/18  OR 5/30/18- L achilles I&D, prevena placement  Periop Antibx  DVT ppx  PT   Pain mgt  ID c/s Admit 5/30/18  OR 5/30/18- L achilles I&D, prevena placement  Periop Antibx  DVT ppx  PT   Pain mgt  ID c/s- coag negative staph infection Admit 5/30/18  OR 5/30/18- L achilles I&D, prevena placement  Periop Antibx  DVT ppx  PT   Pain mgt  ID c/s- coag negative staph infection - doxcycline for 7 days per ID depending on incision healing

## 2018-06-04 DIAGNOSIS — M76.62 ACHILLES TENDINITIS, LEFT LEG: ICD-10-CM

## 2018-06-04 DIAGNOSIS — B95.7 OTHER STAPHYLOCOCCUS AS THE CAUSE OF DISEASES CLASSIFIED ELSEWHERE: ICD-10-CM

## 2018-06-04 DIAGNOSIS — D50.9 IRON DEFICIENCY ANEMIA, UNSPECIFIED: ICD-10-CM

## 2018-06-04 DIAGNOSIS — T81.32XA DISRUPTION OF INTERNAL OPERATION (SURGICAL) WOUND, NOT ELSEWHERE CLASSIFIED, INITIAL ENCOUNTER: ICD-10-CM
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
## 2018-06-04 DIAGNOSIS — T81.4XXA INFECTION FOLLOWING A PROCEDURE, INITIAL ENCOUNTER: ICD-10-CM

## 2018-06-04 DIAGNOSIS — Z16.24 RESISTANCE TO MULTIPLE ANTIBIOTICS: ICD-10-CM

## 2018-06-06 ENCOUNTER — APPOINTMENT (OUTPATIENT)
Dept: ORTHOPEDIC SURGERY | Facility: CLINIC | Age: 68
End: 2018-06-06
Payer: MEDICARE

## 2018-06-06 VITALS — BODY MASS INDEX: 26.86 KG/M2 | HEIGHT: 75 IN | RESPIRATION RATE: 16 BRPM | WEIGHT: 216 LBS

## 2018-06-06 PROCEDURE — 29405 APPL SHORT LEG CAST: CPT | Mod: 58,LT

## 2018-06-06 PROCEDURE — 99024 POSTOP FOLLOW-UP VISIT: CPT

## 2018-06-13 ENCOUNTER — APPOINTMENT (OUTPATIENT)
Dept: ORTHOPEDIC SURGERY | Facility: CLINIC | Age: 68
End: 2018-06-13
Payer: MEDICARE

## 2018-06-13 VITALS — BODY MASS INDEX: 26.86 KG/M2 | HEIGHT: 75 IN | WEIGHT: 216 LBS

## 2018-06-13 DIAGNOSIS — T14.8XXA OTHER INJURY OF UNSPECIFIED BODY REGION, INITIAL ENCOUNTER: ICD-10-CM

## 2018-06-13 PROCEDURE — 99024 POSTOP FOLLOW-UP VISIT: CPT

## 2018-06-13 RX ORDER — SULFAMETHOXAZOLE AND TRIMETHOPRIM 800; 160 MG/1; MG/1
800-160 TABLET ORAL TWICE DAILY
Qty: 28 | Refills: 0 | Status: DISCONTINUED | COMMUNITY
Start: 2018-05-14 | End: 2018-06-13

## 2018-06-13 RX ORDER — ONDANSETRON 4 MG/1
4 TABLET ORAL
Qty: 5 | Refills: 0 | Status: DISCONTINUED | COMMUNITY
Start: 2018-04-25 | End: 2018-06-13

## 2018-06-13 RX ORDER — ASPIRIN 325 MG/1
325 TABLET, FILM COATED ORAL TWICE DAILY
Qty: 60 | Refills: 0 | Status: DISCONTINUED | COMMUNITY
Start: 2018-04-25 | End: 2018-06-13

## 2018-06-13 RX ORDER — OXYCODONE AND ACETAMINOPHEN 5; 325 MG/1; MG/1
5-325 TABLET ORAL
Qty: 60 | Refills: 0 | Status: DISCONTINUED | COMMUNITY
Start: 2018-04-25 | End: 2018-06-13

## 2018-06-27 ENCOUNTER — APPOINTMENT (OUTPATIENT)
Dept: ORTHOPEDIC SURGERY | Facility: CLINIC | Age: 68
End: 2018-06-27
Payer: MEDICARE

## 2018-06-27 VITALS — BODY MASS INDEX: 26.86 KG/M2 | RESPIRATION RATE: 16 BRPM | HEIGHT: 75 IN | WEIGHT: 216 LBS

## 2018-06-27 PROCEDURE — 99024 POSTOP FOLLOW-UP VISIT: CPT

## 2018-06-27 RX ORDER — DOCUSATE SODIUM 100 MG/1
100 CAPSULE ORAL
Qty: 14 | Refills: 0 | Status: DISCONTINUED | COMMUNITY
Start: 2018-05-14 | End: 2018-06-27

## 2018-06-27 RX ORDER — DOXYCYCLINE HYCLATE 100 MG/1
100 TABLET ORAL TWICE DAILY
Qty: 20 | Refills: 0 | Status: DISCONTINUED | COMMUNITY
Start: 2018-06-06 | End: 2018-06-27

## 2018-07-02 PROBLEM — T14.8XXA WOUND DRAINAGE: Status: RESOLVED | Noted: 2018-05-19 | Resolved: 2018-07-02

## 2018-07-02 RX ORDER — ASPIRIN 325 MG/1
325 TABLET, FILM COATED ORAL TWICE DAILY
Qty: 60 | Refills: 0 | Status: DISCONTINUED | COMMUNITY
Start: 2018-06-06 | End: 2018-07-02

## 2018-07-16 ENCOUNTER — APPOINTMENT (OUTPATIENT)
Dept: ORTHOPEDIC SURGERY | Facility: CLINIC | Age: 68
End: 2018-07-16
Payer: MEDICARE

## 2018-07-20 ENCOUNTER — APPOINTMENT (OUTPATIENT)
Dept: ORTHOPEDIC SURGERY | Facility: CLINIC | Age: 68
End: 2018-07-20
Payer: MEDICARE

## 2018-07-20 VITALS — WEIGHT: 216 LBS | BODY MASS INDEX: 26.86 KG/M2 | RESPIRATION RATE: 16 BRPM | HEIGHT: 75 IN

## 2018-07-20 PROCEDURE — 99024 POSTOP FOLLOW-UP VISIT: CPT

## 2018-08-06 NOTE — ED PROVIDER NOTE - CPE EDP SKIN NORM
Spoke with patient and let her know Dr Shital Velazquez would prefer to wait and see her in September to order MRI  Patient understood  normal...

## 2018-08-08 ENCOUNTER — APPOINTMENT (OUTPATIENT)
Dept: ORTHOPEDIC SURGERY | Facility: CLINIC | Age: 68
End: 2018-08-08
Payer: MEDICARE

## 2018-08-08 VITALS — RESPIRATION RATE: 16 BRPM | HEIGHT: 75 IN | BODY MASS INDEX: 26.86 KG/M2 | WEIGHT: 216 LBS

## 2018-08-08 PROCEDURE — 99213 OFFICE O/P EST LOW 20 MIN: CPT

## 2018-09-14 ENCOUNTER — APPOINTMENT (OUTPATIENT)
Dept: ORTHOPEDIC SURGERY | Facility: CLINIC | Age: 68
End: 2018-09-14
Payer: MEDICARE

## 2018-09-14 VITALS — BODY MASS INDEX: 26.86 KG/M2 | RESPIRATION RATE: 16 BRPM | HEIGHT: 75 IN | WEIGHT: 216 LBS

## 2018-09-14 DIAGNOSIS — S86.012D STRAIN OF LEFT ACHILLES TENDON, SUBSEQUENT ENCOUNTER: ICD-10-CM

## 2018-09-14 PROCEDURE — 99213 OFFICE O/P EST LOW 20 MIN: CPT

## 2018-11-29 NOTE — DISCHARGE NOTE ADULT - NURSING SECTION COMPLETE
At 0000 RN paged MD Phelps to inform nose still bleeding as a slow drip down the back of patient's throat and out of patient's nose, causing patient to gag and have one bloody emesis.  MD Phelps advised RN to have patient on bedrest with head of bead elevated at 30 degree, use nose clamp, and get STAT H&H. Mattie advised RN to page him if patient has a hemorrhage.     At 0043 patient had critical hemoglobin of 6.5.  RN paged cross coverage MD and received an order for 1 unit Packed Red Blood Cells, and one time dose of Afrin from MD Dorado.  Estrada also advised RN to place Rhino rockets in patient's room in case needed.  Informed consent explained to patient over phone by MD Dorado.  Copy of informed consent placed in patient file.      At this time blood infusing, Afrin given, and unused rhino rockets are in patient room if needed.  Patient nose continues to bleed a slow drip down the back of patient throat and out of patient's nose.  RN will monitor patient closely.   Per MD Phelps if patient begins to hemorrhage RN will page ENT MD Phelps   Patient/Caregiver provided printed discharge information.

## 2021-05-06 ENCOUNTER — RESULT REVIEW (OUTPATIENT)
Age: 71
End: 2021-05-06

## 2021-05-06 ENCOUNTER — APPOINTMENT (OUTPATIENT)
Dept: ORTHOPEDIC SURGERY | Facility: CLINIC | Age: 71
End: 2021-05-06
Payer: MEDICARE

## 2021-05-06 ENCOUNTER — OUTPATIENT (OUTPATIENT)
Dept: OUTPATIENT SERVICES | Facility: HOSPITAL | Age: 71
LOS: 1 days | End: 2021-05-06
Payer: MEDICARE

## 2021-05-06 VITALS — HEIGHT: 75 IN | SYSTOLIC BLOOD PRESSURE: 130 MMHG | DIASTOLIC BLOOD PRESSURE: 70 MMHG

## 2021-05-06 VITALS — WEIGHT: 225 LBS | BODY MASS INDEX: 28.12 KG/M2

## 2021-05-06 DIAGNOSIS — Z98.890 OTHER SPECIFIED POSTPROCEDURAL STATES: Chronic | ICD-10-CM

## 2021-05-06 PROCEDURE — 72020 X-RAY EXAM OF SPINE 1 VIEW: CPT | Mod: 26

## 2021-05-06 PROCEDURE — 73502 X-RAY EXAM HIP UNI 2-3 VIEWS: CPT

## 2021-05-06 PROCEDURE — 73502 X-RAY EXAM HIP UNI 2-3 VIEWS: CPT | Mod: 26,RT

## 2021-05-06 PROCEDURE — 99214 OFFICE O/P EST MOD 30 MIN: CPT

## 2021-05-06 PROCEDURE — 72020 X-RAY EXAM OF SPINE 1 VIEW: CPT

## 2021-05-06 PROCEDURE — 99072 ADDL SUPL MATRL&STAF TM PHE: CPT

## 2021-05-07 NOTE — HISTORY OF PRESENT ILLNESS
[Worsening] : worsening [___ yrs] : [unfilled] year(s) ago [6] : a current pain level of 6/10 [Constant] : ~He/She~ states the symptoms seem to be constant [Bending] : worsened by bending [Sitting] : worsened by sitting [Walking] : worsened by walking [de-identified] : 69y/o male presenting for evaluation of progressive right hip pain and stiffness since Oct 2020. No injury. Symptoms occur daily but intensity is variable. Has been taking Tylenol with slight relief. Started PT three weeks ago, has had about a half dozen sessions so far; can't tell if it's helping. Has never had injections or surgery. Feels like the right leg is longer than the left. Exercise tolerance remains unlimited; thinks he can walk a mile without cane, though would expect to be stiff and sore by the end. Retired from employment at [employer]. No PMH other than diet-controlled HLD. He is here to discuss ALFRED.\par \par He is the  of [employer]. [None] : No relieving factors are noted [de-identified] : describes sharp pain

## 2021-05-07 NOTE — PHYSICAL EXAM
[de-identified] : General appearance: well nourished and hydrated, pleasant, alert and oriented x 3, cooperative.  Tall slim habitus.\par HEENT: normocephalic, EOM intact, wearing mask, external auditory canal clear.  \par Cardiovascular: no lower leg edema, no varicosities, dorsalis pedis pulses palpable and symmetric.  \par Lymphatics: no palpable lymphadenopathy, no lymphedema.  \par Neurologic: sensation is normal, no muscle weakness in upper or lower extremities, patella tendon reflexes present and symmetric.  \par Dermatologic: skin moist, warm, no rash.  \par Spine: cervical spine with normal lordosis and painless range of motion, thoracic spine with normal kyphosis and painless range of motion, lumbosacral spine with normal lordosis and painless range of motion.  No tenderness to palpation along midline spine and paraspinal musculature.  Sacroiliac joints nontender bilaterally. Negative SLR and crossed SLR tests bilaterally but posterior chains notably tight.\par Gait: mild right antalgia.\par \par Limb lengths: RLE about 3-4mm shorter than LLE\par \par Left hip:\par - Skin intact, no scars or other prior surgical incisions noted\par - No swelling/ecchymosis\par - No specific tenderness on palpation\par - ROM: 100 flexion, 0 extension, 10 adduction, 30 abduction, 10 internal rotation, 50 external rotation\par - BUZZ painless\par - FADIR painful\par - Elise negative\par - Stinchfield positive\par - Flexor power 5/5\par - Abductor power 5/5\par \par Right hip:\par - Skin intact, no scars or other prior surgical incisions noted\par - No swelling/ecchymosis\par - Mild anterior tenderness on palpation\par - ROM: 90 flexion, 0 extension, 0 adduction, 30 abduction, 0 internal rotation, 40 external rotation\par - BUZZ painful\par - FADIR painful\par - Elise positive\par - Stinchfield positive\par - Flexor power 5/5\par - Abductor power 5/5 [de-identified] : A lateral view of the lumbosacral spine, weightbearing AP pelvis, and 2 additional views (frog lateral and false profile) of the right hip were obtained today, interpreted by me, and reviewed with the patient.\par \par The lumbar spine demonstrates lordosis within the normal range without evidence of listhesis or other instability. There is multilevel spondylosis with associated facet arthrosis.\par \par There is no pelvic obliquity.\par \par The bilateral hips demonstrate normal alignment. There is moderate osteoarthritis on the left and severe osteoarthritis with complete superior joint space effacement on the right. There are bilateral cam deformities.\par \par The bilateral sacroiliac joints appear normal without arthrosis.\par

## 2021-05-07 NOTE — DISCUSSION/SUMMARY
[de-identified] : 69y/o male with R >> L hip osteoarthritis\par - We discussed the details of right total hip replacement, the expected recovery period, and the expected outcome. We discussed the likelihood of satisfaction after complete recovery, and the potential causes of dissatisfaction. The importance of active patient participation in the rehabilitation protocol was emphasized, along with its influence on short and long-term outcomes. We discussed the risks, benefits, and alternatives of surgery at length. Specific risks of total hip replacement were discussed in detail. We discussed the risk of surgical site complications including but not limited to: surgical site infection, wound healing complications, bone fracture, tendon or ligament injury, neurovascular injury, hemorrhage, postoperative stiffness or instability, persistent pain, limb length discrepancy, and need for reoperation. We discussed surgical blood loss and the possible need for blood transfusion. We discussed the risk of perioperative medical complications, including but not limited to catheter-associated urinary tract infection, venous thromboembolism and other cardiopulmonary complications. We discussed anesthetic options and the risk of anesthesia-related complications. We discussed the potential benefits of surgery including the potential to improve the current clinical condition through operative intervention. I emphasized that there are alternatives to surgical intervention including continued conservative management, though such a course could yield less than optimal results in this particular patient. A model was used to demonstrate the operation and to discuss bearing surfaces of the implants. We discussed implant fixation methods; my plan would be to use fully cementless fixation in this case. We discussed the various surgical approaches to the hip; I think that an anterior approach would be appropriate in this case. We discussed the durability of prosthetic hips and limitations related to wear, osteolysis and loosening. All questions were answered to the patient's satisfaction. The patient was given a copy of my preoperative packet with additional information about the procedure. I asked the patient to either call back or schedule a followup appointment for any additional questions or concerns regarding the procedure.\par - Ultimately he is not yet sure that he wants to schedule the procedure. Will continue with conservative management until he feels more comfortable moving forward with surgery.\par - Cont PT\par - HEP encouraged\par - Cont Tylenol, add meloxicam as needed\par - IR referral for R hip CSI\par - RTC 3mo, no new XRs needed; or earlier as needed to discuss surgery\par

## 2021-05-07 NOTE — DISCUSSION/SUMMARY
[de-identified] : 71y/o male with R >> L hip osteoarthritis\par - We discussed the details of right total hip replacement, the expected recovery period, and the expected outcome. We discussed the likelihood of satisfaction after complete recovery, and the potential causes of dissatisfaction. The importance of active patient participation in the rehabilitation protocol was emphasized, along with its influence on short and long-term outcomes. We discussed the risks, benefits, and alternatives of surgery at length. Specific risks of total hip replacement were discussed in detail. We discussed the risk of surgical site complications including but not limited to: surgical site infection, wound healing complications, bone fracture, tendon or ligament injury, neurovascular injury, hemorrhage, postoperative stiffness or instability, persistent pain, limb length discrepancy, and need for reoperation. We discussed surgical blood loss and the possible need for blood transfusion. We discussed the risk of perioperative medical complications, including but not limited to catheter-associated urinary tract infection, venous thromboembolism and other cardiopulmonary complications. We discussed anesthetic options and the risk of anesthesia-related complications. We discussed the potential benefits of surgery including the potential to improve the current clinical condition through operative intervention. I emphasized that there are alternatives to surgical intervention including continued conservative management, though such a course could yield less than optimal results in this particular patient. A model was used to demonstrate the operation and to discuss bearing surfaces of the implants. We discussed implant fixation methods; my plan would be to use fully cementless fixation in this case. We discussed the various surgical approaches to the hip; I think that an anterior approach would be appropriate in this case. We discussed the durability of prosthetic hips and limitations related to wear, osteolysis and loosening. All questions were answered to the patient's satisfaction. The patient was given a copy of my preoperative packet with additional information about the procedure. I asked the patient to either call back or schedule a followup appointment for any additional questions or concerns regarding the procedure.\par - Ultimately he is not yet sure that he wants to schedule the procedure. Will continue with conservative management until he feels more comfortable moving forward with surgery.\par - Cont PT\par - HEP encouraged\par - Cont Tylenol, add meloxicam as needed\par - IR referral for R hip CSI\par - RTC 3mo, no new XRs needed; or earlier as needed to discuss surgery\par

## 2021-05-07 NOTE — HISTORY OF PRESENT ILLNESS
[Worsening] : worsening [___ yrs] : [unfilled] year(s) ago [6] : a current pain level of 6/10 [Constant] : ~He/She~ states the symptoms seem to be constant [Bending] : worsened by bending [Sitting] : worsened by sitting [Walking] : worsened by walking [de-identified] : 71y/o male presenting for evaluation of progressive right hip pain and stiffness since Oct 2020. No injury. Symptoms occur daily but intensity is variable. Has been taking Tylenol with slight relief. Started PT three weeks ago, has had about a half dozen sessions so far; can't tell if it's helping. Has never had injections or surgery. Feels like the right leg is longer than the left. Exercise tolerance remains unlimited; thinks he can walk a mile without cane, though would expect to be stiff and sore by the end. Retired from employment at [employer]. No PMH other than diet-controlled HLD. He is here to discuss ALFRED.\par \par He is the  of [employer]. [None] : No relieving factors are noted [de-identified] : describes sharp pain

## 2021-05-07 NOTE — PHYSICAL EXAM
[de-identified] : General appearance: well nourished and hydrated, pleasant, alert and oriented x 3, cooperative.  Tall slim habitus.\par HEENT: normocephalic, EOM intact, wearing mask, external auditory canal clear.  \par Cardiovascular: no lower leg edema, no varicosities, dorsalis pedis pulses palpable and symmetric.  \par Lymphatics: no palpable lymphadenopathy, no lymphedema.  \par Neurologic: sensation is normal, no muscle weakness in upper or lower extremities, patella tendon reflexes present and symmetric.  \par Dermatologic: skin moist, warm, no rash.  \par Spine: cervical spine with normal lordosis and painless range of motion, thoracic spine with normal kyphosis and painless range of motion, lumbosacral spine with normal lordosis and painless range of motion.  No tenderness to palpation along midline spine and paraspinal musculature.  Sacroiliac joints nontender bilaterally. Negative SLR and crossed SLR tests bilaterally but posterior chains notably tight.\par Gait: mild right antalgia.\par \par Limb lengths: RLE about 3-4mm shorter than LLE\par \par Left hip:\par - Skin intact, no scars or other prior surgical incisions noted\par - No swelling/ecchymosis\par - No specific tenderness on palpation\par - ROM: 100 flexion, 0 extension, 10 adduction, 30 abduction, 10 internal rotation, 50 external rotation\par - BUZZ painless\par - FADIR painful\par - Elise negative\par - Stinchfield positive\par - Flexor power 5/5\par - Abductor power 5/5\par \par Right hip:\par - Skin intact, no scars or other prior surgical incisions noted\par - No swelling/ecchymosis\par - Mild anterior tenderness on palpation\par - ROM: 90 flexion, 0 extension, 0 adduction, 30 abduction, 0 internal rotation, 40 external rotation\par - BUZZ painful\par - FADIR painful\par - Elise positive\par - Stinchfield positive\par - Flexor power 5/5\par - Abductor power 5/5 [de-identified] : A lateral view of the lumbosacral spine, weightbearing AP pelvis, and 2 additional views (frog lateral and false profile) of the right hip were obtained today, interpreted by me, and reviewed with the patient.\par \par The lumbar spine demonstrates lordosis within the normal range without evidence of listhesis or other instability. There is multilevel spondylosis with associated facet arthrosis.\par \par There is no pelvic obliquity.\par \par The bilateral hips demonstrate normal alignment. There is moderate osteoarthritis on the left and severe osteoarthritis with complete superior joint space effacement on the right. There are bilateral cam deformities.\par \par The bilateral sacroiliac joints appear normal without arthrosis.\par

## 2021-11-03 NOTE — H&P ADULT - NSHPLABSRESULTS_GEN_ALL_CORE
Labs ordered.     MD YONI Blackwood [facility]  11/03/21
Preop CBC, BMP, PT/PTT/INR, UA - pending review per medical clearance   Pre op EKG/CXR pending review per medical clearance   CRP 1

## 2021-12-01 ENCOUNTER — RESULT REVIEW (OUTPATIENT)
Age: 71
End: 2021-12-01

## 2021-12-01 ENCOUNTER — APPOINTMENT (OUTPATIENT)
Dept: ULTRASOUND IMAGING | Facility: CLINIC | Age: 71
End: 2021-12-01
Payer: MEDICARE

## 2021-12-01 ENCOUNTER — OUTPATIENT (OUTPATIENT)
Dept: OUTPATIENT SERVICES | Facility: HOSPITAL | Age: 71
LOS: 1 days | End: 2021-12-01

## 2021-12-01 DIAGNOSIS — Z98.890 OTHER SPECIFIED POSTPROCEDURAL STATES: Chronic | ICD-10-CM

## 2021-12-01 PROCEDURE — 20611 DRAIN/INJ JOINT/BURSA W/US: CPT | Mod: RT

## 2021-12-20 ENCOUNTER — APPOINTMENT (OUTPATIENT)
Dept: ORTHOPEDIC SURGERY | Facility: CLINIC | Age: 71
End: 2021-12-20

## 2022-01-31 ENCOUNTER — APPOINTMENT (OUTPATIENT)
Dept: ORTHOPEDIC SURGERY | Facility: CLINIC | Age: 72
End: 2022-01-31
Payer: MEDICARE

## 2022-01-31 VITALS
WEIGHT: 230 LBS | BODY MASS INDEX: 28.6 KG/M2 | DIASTOLIC BLOOD PRESSURE: 50 MMHG | HEIGHT: 75 IN | HEART RATE: 63 BPM | SYSTOLIC BLOOD PRESSURE: 172 MMHG | OXYGEN SATURATION: 97 %

## 2022-01-31 PROCEDURE — 99213 OFFICE O/P EST LOW 20 MIN: CPT

## 2022-01-31 NOTE — HISTORY OF PRESENT ILLNESS
[de-identified] : 1/31/22: Reports about 80-90% pain relief from right hip CSI on 12/1/21. Still doing very well; currently only has a bit of pain with initiation of movement that resolves after a few steps. Discontinued PT but is still doing HEP. \par \par 5/6/21: 71y/o male presenting for evaluation of progressive right hip pain and stiffness since Oct 2020. No injury. Symptoms occur daily but intensity is variable. Has been taking Tylenol with slight relief. Started PT three weeks ago, has had about a half dozen sessions so far; can't tell if it's helping. Has never had injections or surgery. Feels like the right leg is longer than the left. Exercise tolerance remains unlimited; thinks he can walk a mile without cane, though would expect to be stiff and sore by the end. Retired from employment at [employer]. No PMH other than diet-controlled HLD. He is here to discuss ALFRED.\par \par He is the  of [employer].

## 2022-01-31 NOTE — DISCUSSION/SUMMARY
[de-identified] : 70y/o male with R >> L hip osteoarthritis, right hip symptoms greatly improved from CSI\par - Cont HEP\par - RTC May 2022 with repeat bilateral hip XRs or earlier as needed. May consider further CSI pending ongoing clinical progress but I still believe that he will require ALFRED within the next 1-2 years

## 2022-01-31 NOTE — PHYSICAL EXAM
[de-identified] : General appearance: well nourished and hydrated, pleasant, alert and oriented x 3, cooperative.  Tall slim habitus.\par HEENT: normocephalic, EOM intact, wearing mask, external auditory canal clear.  \par Cardiovascular: no lower leg edema, no varicosities, dorsalis pedis pulses palpable and symmetric.  \par Lymphatics: no palpable lymphadenopathy, no lymphedema.  \par Neurologic: sensation is normal, no muscle weakness in upper or lower extremities, patella tendon reflexes present and symmetric.  \par Dermatologic: skin moist, warm, no rash.  \par Spine: cervical spine with normal lordosis and painless range of motion, thoracic spine with normal kyphosis and painless range of motion, lumbosacral spine with normal lordosis and painless range of motion.  No tenderness to palpation along midline spine and paraspinal musculature.  Sacroiliac joints nontender bilaterally. Negative SLR and crossed SLR tests bilaterally but posterior chains notably tight.\par Gait: mild right antalgia.\par \par Limb lengths: RLE about 3-4mm shorter than LLE\par \par Left hip:\par - Skin intact, no scars or other prior surgical incisions noted\par - No swelling/ecchymosis\par - No specific tenderness on palpation\par - ROM: 100 flexion, 0 extension, 10 adduction, 30 abduction, 10 internal rotation, 50 external rotation\par - BUZZ painless\par - FADIR painful\par - Elise negative\par - Stinchfield positive\par - Flexor power 5/5\par - Abductor power 5/5\par \par Right hip:\par - Skin intact, no scars or other prior surgical incisions noted\par - No swelling/ecchymosis\par - No specific tenderness on palpation\par - ROM: 90 flexion, 0 extension, 0 adduction, 30 abduction, 0 internal rotation, 40 external rotation\par - BUZZ stiff and uncomfortable\par - FADIR stiff and uncomfortable\par - Elise positive\par - Stinchfield positive\par - Flexor power 5/5\par - Abductor power 5/5

## 2022-03-23 ENCOUNTER — APPOINTMENT (OUTPATIENT)
Dept: UROLOGY | Facility: CLINIC | Age: 72
End: 2022-03-23
Payer: MEDICARE

## 2022-03-23 VITALS
SYSTOLIC BLOOD PRESSURE: 124 MMHG | HEIGHT: 75 IN | RESPIRATION RATE: 15 BRPM | BODY MASS INDEX: 28.6 KG/M2 | HEART RATE: 57 BPM | DIASTOLIC BLOOD PRESSURE: 81 MMHG | WEIGHT: 230 LBS

## 2022-03-23 PROCEDURE — 99204 OFFICE O/P NEW MOD 45 MIN: CPT

## 2022-03-28 NOTE — ASSESSMENT
[FreeTextEntry1] : We had a long discussion regarding PSA level. Different parameters including PSA velocity, age-adjusted PSA, free PSA, etc reviewed. Some studies have indicated that the absolute PSA level may be more accurate than the other parameters listed above. We discussed observation and repeat PSA, prostate biopsy and prostate or pelvic MRI. The false negative rate of MRI was discussed. Risks of biopsy discussed included but were not limited to bleeding, sepsis, bacteremia, urinary tract infection, erectile dysfunction, etc. How the procedure is performed and preparation with antibiotics and enema were discussed. Risks of observation and not proceeding with biopsy were reviewed. Patient was made aware that prostate cancer can exist at any PSA level. Potential complications of delayed prostate cancer diagnosis were discussed.\par will get mri to eval for targetable lesion possible fusion biopsy

## 2022-03-28 NOTE — HISTORY OF PRESENT ILLNESS
[FreeTextEntry1] : Elevated PSA \par Patient is here with an elevated PSA. He has no personal history and no family history of prostate cancer.He has no prior genitourinary history of hematuria, hematospermia, prostatitis, UTI, erectile dysfunction, urolithiasis, epididymal orchitis. \par No dysuria or hematuria\par psa 5.8\par

## 2022-05-09 ENCOUNTER — APPOINTMENT (OUTPATIENT)
Dept: ORTHOPEDIC SURGERY | Facility: CLINIC | Age: 72
End: 2022-05-09

## 2022-05-11 ENCOUNTER — APPOINTMENT (OUTPATIENT)
Dept: ORTHOPEDIC SURGERY | Facility: CLINIC | Age: 72
End: 2022-05-11
Payer: MEDICARE

## 2022-05-11 VITALS — DIASTOLIC BLOOD PRESSURE: 78 MMHG | SYSTOLIC BLOOD PRESSURE: 143 MMHG

## 2022-05-11 PROCEDURE — 99213 OFFICE O/P EST LOW 20 MIN: CPT

## 2022-05-11 NOTE — HISTORY OF PRESENT ILLNESS
[de-identified] : 5/11/22: Reports that the right hip is still minimally painful but he still experiencing relative weakness and stiffness particularly when rising from seated position. Has no limitations with regard to ambulatory tolerance or day to day activities; never needs assistive device. Has been using Tylenol and meloxicam as needed. Is undergoing workup for possible prostate CA and is prioritizing that right now.\par \par 1/31/22: Reports about 80-90% pain relief from right hip CSI on 12/1/21. Still doing very well; currently only has a bit of pain with initiation of movement that resolves after a few steps. Discontinued PT but is still doing HEP. \par \par 5/6/21: 69y/o male presenting for evaluation of progressive right hip pain and stiffness since Oct 2020. No injury. Symptoms occur daily but intensity is variable. Has been taking Tylenol with slight relief. Started PT three weeks ago, has had about a half dozen sessions so far; can't tell if it's helping. Has never had injections or surgery. Feels like the right leg is longer than the left. Exercise tolerance remains unlimited; thinks he can walk a mile without cane, though would expect to be stiff and sore by the end. Retired from employment at [employer]. No PMH other than diet-controlled HLD. He is here to discuss ALFRED.\par \par He is the  of Vancejamesonmontserrat Luis.

## 2022-05-11 NOTE — DISCUSSION/SUMMARY
[de-identified] : 72y/o male with R >> L hip osteoarthritis, right hip symptoms still relatively benign\par - Cont HEP\par - Cont Tylenol and meloxicam as needed\par - RTC 6mo with repeat bilateral hip XRs or earlier as neeed. Can consider another CSI vs. ALFRED pending clinical progress

## 2022-05-11 NOTE — PHYSICAL EXAM
[de-identified] : General appearance: well nourished and hydrated, pleasant, alert and oriented x 3, cooperative. \par HEENT: normocephalic, EOM intact, wearing mask, external auditory canal clear.  \par Cardiovascular: no lower leg edema, no varicosities, dorsalis pedis pulses palpable and symmetric.  \par Lymphatics: no palpable lymphadenopathy, no lymphedema.  \par Neurologic: sensation is normal, no muscle weakness in upper or lower extremities, patella tendon reflexes present and symmetric.  \par Dermatologic: skin moist, warm, no rash.  \par Spine: cervical spine with normal lordosis and painless range of motion, thoracic spine with normal kyphosis and painless range of motion, lumbosacral spine with normal lordosis and painless range of motion.  No tenderness to palpation along midline spine and paraspinal musculature.  Sacroiliac joints nontender bilaterally. Negative SLR and crossed SLR tests bilaterally but posterior chains notably tight.\par Gait: slow to stand and get started, mild right antalgia upon initiation that irons out after a few steps.\par \par Limb lengths: RLE about 3-4mm shorter than LLE\par \par Left hip:\par - Skin intact, no scars or other prior surgical incisions noted\par - No swelling/ecchymosis\par - No specific tenderness on palpation\par - ROM: 100 flexion, 0 extension, 10 adduction, 30 abduction, 10 internal rotation, 50 external rotation\par - BUZZ painless\par - FADIR painful\par - Elise negative\par - Stinchfield positive\par - Flexor power 5/5\par - Abductor power 5/5\par \par Right hip:\par - Skin intact, no scars or other prior surgical incisions noted\par - No swelling/ecchymosis\par - No specific tenderness on palpation\par - ROM: 90 flexion, 0 extension, 0 adduction, 30 abduction, 0 internal rotation, 40 external rotation\par - BUZZ stiff and uncomfortable\par - FADIR stiff and uncomfortable\par - Elise positive\par - Stinchfield positive\par - Flexor power 5/5\par - Abductor power 5/5 [de-identified] : Patient declined XRs today

## 2022-05-18 ENCOUNTER — APPOINTMENT (OUTPATIENT)
Dept: UROLOGY | Facility: CLINIC | Age: 72
End: 2022-05-18
Payer: MEDICARE

## 2022-05-18 PROCEDURE — 99214 OFFICE O/P EST MOD 30 MIN: CPT

## 2022-05-18 NOTE — HISTORY OF PRESENT ILLNESS
[FreeTextEntry1] : Elevated PSA \par Patient is here with an elevated PSA. He has no personal history and no family history of prostate cancer.He has no prior genitourinary history of hematuria, hematospermia, prostatitis, UTI, erectile dysfunction, urolithiasis, epididymal orchitis. \par No dysuria or hematuria\par psa 5.8\par \par \par mri 6 mm left postlat apex periph\par 36 cc

## 2022-05-18 NOTE — ASSESSMENT
[FreeTextEntry1] : mri images reviewed\par The risks of the biopsy procedure including but not limited to sepsis, hemorrhage,hematuria, hematochezia rectal pain, hematospermia, false positives andfalse negatives, possible need of repeat biopsies, and rectal injury were all discussed, understood, and accepted by the patient.\par \par Will schedule for fusion biopsy\par

## 2022-05-20 LAB — BACTERIA UR CULT: NORMAL

## 2022-08-16 ENCOUNTER — APPOINTMENT (OUTPATIENT)
Dept: UROLOGY | Facility: CLINIC | Age: 72
End: 2022-08-16

## 2022-08-16 ENCOUNTER — OUTPATIENT (OUTPATIENT)
Dept: OUTPATIENT SERVICES | Facility: HOSPITAL | Age: 72
LOS: 1 days | End: 2022-08-16
Payer: MEDICARE

## 2022-08-16 DIAGNOSIS — R97.20 ELEVATED PROSTATE, SPECIFIC ANTIGEN [PSA]: ICD-10-CM

## 2022-08-16 DIAGNOSIS — Z98.890 OTHER SPECIFIED POSTPROCEDURAL STATES: Chronic | ICD-10-CM

## 2022-08-16 DIAGNOSIS — R35.0 FREQUENCY OF MICTURITION: ICD-10-CM

## 2022-08-16 PROCEDURE — 55700: CPT

## 2022-08-16 PROCEDURE — 76942 ECHO GUIDE FOR BIOPSY: CPT | Mod: 59

## 2022-08-16 PROCEDURE — 76377 3D RENDER W/INTRP POSTPROCES: CPT | Mod: 26

## 2022-08-16 PROCEDURE — 76872 US TRANSRECTAL: CPT | Mod: 26

## 2022-08-26 DIAGNOSIS — R97.20 ELEVATED PROSTATE SPECIFIC ANTIGEN [PSA]: ICD-10-CM

## 2022-09-07 ENCOUNTER — APPOINTMENT (OUTPATIENT)
Dept: UROLOGY | Facility: CLINIC | Age: 72
End: 2022-09-07

## 2022-09-07 VITALS
DIASTOLIC BLOOD PRESSURE: 78 MMHG | BODY MASS INDEX: 28.6 KG/M2 | HEART RATE: 67 BPM | OXYGEN SATURATION: 98 % | RESPIRATION RATE: 16 BRPM | SYSTOLIC BLOOD PRESSURE: 134 MMHG | HEIGHT: 75 IN | WEIGHT: 230 LBS | TEMPERATURE: 98.6 F

## 2022-09-07 LAB — CORE LAB BIOPSY: NORMAL

## 2022-09-07 PROCEDURE — 99214 OFFICE O/P EST MOD 30 MIN: CPT

## 2022-09-13 NOTE — ASSESSMENT
[FreeTextEntry1] : We had a long discussion with the patient explaining the diagnosis of prostate cancer and the meaning of his Андрей Grade. We discussed all options of therapy, including observation, radiation therapy, endocrine therapy, and radical surgical extirpation.\par We discussed tumor progression and the natural course of prostate cancer, including metastases, especially to bone, and that observation would allow his prostate cancer to progress. We discussed radiation therapy, both external beam and interstitial radioactive seeds, and the morbidity involved, including cystitis, proctitis, urinary and incontinence and erectile dysfunction. We discussed endocrine therapy, that it would likely slow but not stop cancer progression, and the results of androgen ablation, including but not limited to bone density loss, muscle mass loss, changes in libido, mood, energy and depression. We discussed surgical therapy, including surgical techniques, outcomes and potential need for subsequent additional radiation therapy if positive surgical margins, and complications including infection and blood loss, and long term complications including urinary incontinence and erectile dysfunction.Staging will be performed with CT of abdomen and pelvis and whole body bone scans\par  \par Explained to the patient that based on his grade of prostate cancer and overall health status would recommend either radiation therapy or surgery.\par  \par Gave him material from [facility] for review. Appropriate reerral were made. reviewed importance of following up with all referrals and exams\par  \par Greater than 50% of the encounter time was spent counseling the patient and I have spent 30  minutes face to face time with the patient. All questions were answered.\par

## 2022-09-13 NOTE — HISTORY OF PRESENT ILLNESS
[FreeTextEntry1] : s/p prostate biopsy \par doing well\par no voiding complaints \par no fever \par path 4+3

## 2022-09-20 RX ORDER — BICALUTAMIDE 50 MG/1
50 TABLET ORAL DAILY
Qty: 30 | Refills: 0 | Status: ACTIVE | COMMUNITY
Start: 2022-09-20 | End: 1900-01-01

## 2022-09-30 ENCOUNTER — APPOINTMENT (OUTPATIENT)
Dept: UROLOGY | Facility: CLINIC | Age: 72
End: 2022-09-30

## 2022-09-30 PROCEDURE — A4648: CPT

## 2022-09-30 PROCEDURE — 55876 PLACE RT DEVICE/MARKER PROS: CPT

## 2022-10-07 ENCOUNTER — APPOINTMENT (OUTPATIENT)
Dept: UROLOGY | Facility: CLINIC | Age: 72
End: 2022-10-07

## 2022-10-07 VITALS
TEMPERATURE: 97.3 F | HEART RATE: 64 BPM | DIASTOLIC BLOOD PRESSURE: 71 MMHG | SYSTOLIC BLOOD PRESSURE: 145 MMHG | WEIGHT: 230 LBS | HEIGHT: 75 IN | BODY MASS INDEX: 28.6 KG/M2

## 2022-10-07 PROCEDURE — 96402 CHEMO HORMON ANTINEOPL SQ/IM: CPT

## 2022-11-08 ENCOUNTER — APPOINTMENT (OUTPATIENT)
Dept: ORTHOPEDIC SURGERY | Facility: CLINIC | Age: 72
End: 2022-11-08

## 2022-12-07 ENCOUNTER — APPOINTMENT (OUTPATIENT)
Dept: UROLOGY | Facility: CLINIC | Age: 72
End: 2022-12-07

## 2022-12-07 VITALS
TEMPERATURE: 97.9 F | SYSTOLIC BLOOD PRESSURE: 131 MMHG | WEIGHT: 230 LBS | OXYGEN SATURATION: 99 % | HEIGHT: 75 IN | BODY MASS INDEX: 28.6 KG/M2 | DIASTOLIC BLOOD PRESSURE: 90 MMHG | HEART RATE: 65 BPM

## 2022-12-07 PROCEDURE — 99213 OFFICE O/P EST LOW 20 MIN: CPT

## 2022-12-07 NOTE — HISTORY OF PRESENT ILLNESS
Pharmacy and patient informed of below   [FreeTextEntry1] : cc f/u \par h/o cap \par completed xrt 11/22\par has some urgency and some pain with defecation \par + hot flashes

## 2022-12-07 NOTE — ASSESSMENT
[FreeTextEntry1] : some symptoms related to xrt nd adt \par not that bothersome does not want  meds\par will schedule eligard for jan \par psa then

## 2022-12-12 RX ORDER — NAPROXEN 500 MG/1
500 TABLET ORAL
Qty: 30 | Refills: 0 | Status: ACTIVE | COMMUNITY
Start: 2022-12-12 | End: 1900-01-01

## 2022-12-12 RX ORDER — AMLODIPINE AND ATORVASTATIN 5; 20 MG/1; MG/1
5-20 TABLET, COATED ORAL
Refills: 0 | Status: ACTIVE | COMMUNITY

## 2023-01-04 ENCOUNTER — APPOINTMENT (OUTPATIENT)
Dept: UROLOGY | Facility: CLINIC | Age: 73
End: 2023-01-04
Payer: MEDICARE

## 2023-01-04 PROCEDURE — 96402 CHEMO HORMON ANTINEOPL SQ/IM: CPT

## 2023-01-11 ENCOUNTER — APPOINTMENT (OUTPATIENT)
Dept: ORTHOPEDIC SURGERY | Facility: CLINIC | Age: 73
End: 2023-01-11

## 2023-01-12 ENCOUNTER — APPOINTMENT (OUTPATIENT)
Dept: UROLOGY | Facility: CLINIC | Age: 73
End: 2023-01-12

## 2023-01-13 ENCOUNTER — APPOINTMENT (OUTPATIENT)
Dept: GASTROENTEROLOGY | Facility: CLINIC | Age: 73
End: 2023-01-13
Payer: MEDICARE

## 2023-01-13 VITALS
SYSTOLIC BLOOD PRESSURE: 149 MMHG | OXYGEN SATURATION: 98 % | WEIGHT: 225 LBS | HEIGHT: 75 IN | BODY MASS INDEX: 27.98 KG/M2 | HEART RATE: 72 BPM | TEMPERATURE: 97.7 F | DIASTOLIC BLOOD PRESSURE: 80 MMHG

## 2023-01-13 PROCEDURE — 99204 OFFICE O/P NEW MOD 45 MIN: CPT

## 2023-01-13 RX ORDER — HYDROCORTISONE 25 MG/G
2.5 CREAM TOPICAL
Qty: 2 | Refills: 3 | Status: ACTIVE | COMMUNITY
Start: 2023-01-13 | End: 1900-01-01

## 2023-01-13 RX ORDER — SIMETHICONE 180 MG
180 CAPSULE ORAL 4 TIMES DAILY
Qty: 120 | Refills: 3 | Status: ACTIVE | COMMUNITY
Start: 2023-01-13 | End: 1900-01-01

## 2023-01-13 RX ORDER — HYDROCORTISONE ACETATE 25 MG/1
25 SUPPOSITORY RECTAL
Qty: 30 | Refills: 3 | Status: ACTIVE | COMMUNITY
Start: 2023-01-13 | End: 1900-01-01

## 2023-01-13 NOTE — REVIEW OF SYSTEMS
[Feeling Tired] : feeling tired [Bloating (gassiness)] : bloating [Negative] : Heme/Lymph [FreeTextEntry3] : blurred vision [FreeTextEntry8] : prostate cancer

## 2023-01-13 NOTE — HISTORY OF PRESENT ILLNESS
[FreeTextEntry1] : The patient is a 72-year-old male with past medical history significant for prostate cancer, s/p radiation therapy who was referred to my office by Dr. Topher Jeff ([phone]) for anal pain x several weeks.  The patient also admits to having dyspepsia. I was asked to render an opinion for consultation for the above complaints.   The patient states that he is feeling uncomfortable x several weeks.  The patient denies any abdominal pain.  The patient complains of abdominal gas and bloating.  The patient denies any nausea or vomiting.  The patient denies any gastroesophageal reflux disease or dysphagia. The patient denies any atypical chest pain, shortness of breath or palpitations.  The patient admits to occasional episodes of diaphoresis.  The patient denies any constipation or diarrhea.  The patient has 1 bowel movement a day. The patient denies a change in bowel habits.  The patient denies a change in caliber of stool.  The patient denies having mucus discharge with the bowel movements.  The patient denies any bright red blood per rectum, melena or hematemesis.  The patient complains of rectal discomfort and rectal pruritus. The patient complains of weight loss but denies any anorexia.  The patient admits to losing 5 pounds over the past 3 months. The patient attributes the weight loss to recent change in diet. He denies any fevers or chills.  The patient denies any jaundice or pruritus.  The patient denies any back pain.  The patient admits to having a prior upper endoscopy and colonoscopy performed by another gastroenterologist over 5 years ago.  According to the patient, the colonoscopic findings revealed a normal study.   The patient denies any significant family history of GI problems.  \par \par (+) prior smoking 1 PPD x 5 years stopped x 50 years, (-) ETOH, (-) IVDA\par

## 2023-01-13 NOTE — ASSESSMENT
[FreeTextEntry1] : Dyspepsia: The patient complains of dyspeptic symptoms.  The patient was advised to abide by an anti-gas (low FOD-MAP) diet.  The patient was given a pamphlet for anti-gas (low FOD-MAP).  The patient and I reviewed the anti-gas (low FOD-MAP) diet at length. The patient is to start on a trial of Simethicone one tablet 4 times a day p.r.n. abdominal pain and gas.\par Ano-Rectal Pain: The patient had episodes of ano-rectal pain.  The etiology of the rectal pain is unclear.  I recommend a trial of Anusol HC suppositories one per rectum QHS and Anusol HC 2.5% cream apply to affected area twice a day PRN hemorrhoidal bleeding or pain.  I recommend a trial of Calmoseptine cream apply to affected area twice a day for rectal discomfort. I recommend Tucks pads for the hemorrhoids.  I recommend starting Sitz baths twice a day for the hemorrhoids.  I recommend avoid wearing tight underwear and use boxers. I recommend avoid touching the perineal area. If the symptoms persist, I recommend a surgical evaluation for possible band ligation of the hemorrhoids. The patient was given the name of colorectal surgeon, Dr. Tin Peres for possible surgery. If the symptoms persist, the patient may require a colonoscopy to assess the site of bleeding. The patient was told of the risks and benefits of the procedure.  The patient was told of the risks of perforation, emergency surgery, bleeding, infections and missed lesions. The patient agrees and will follow up to assess the symptoms\par Prior Endoscopic Procedures: The patient had a prior colonoscopy performed by another gastroenterologist.  I will try to obtain the prior colonoscopy reports.  The patient is to sign a record release to obtain those records.\par Follow-up: The patient is to follow-up in the office in 4 weeks to reassess the symptoms. The patient was told to call the office if any further problems.

## 2023-02-06 ENCOUNTER — APPOINTMENT (OUTPATIENT)
Dept: GASTROENTEROLOGY | Facility: CLINIC | Age: 73
End: 2023-02-06
Payer: MEDICARE

## 2023-02-06 VITALS
WEIGHT: 225 LBS | SYSTOLIC BLOOD PRESSURE: 125 MMHG | OXYGEN SATURATION: 100 % | HEIGHT: 75 IN | DIASTOLIC BLOOD PRESSURE: 74 MMHG | TEMPERATURE: 96.4 F | HEART RATE: 70 BPM | BODY MASS INDEX: 27.98 KG/M2

## 2023-02-06 PROCEDURE — 99214 OFFICE O/P EST MOD 30 MIN: CPT

## 2023-02-06 RX ORDER — LIDOCAINE 5 G/100G
5 OINTMENT TOPICAL
Qty: 300 | Refills: 3 | Status: ACTIVE | COMMUNITY
Start: 2023-02-06 | End: 1900-01-01

## 2023-02-06 RX ORDER — SIMETHICONE 180 MG
180 CAPSULE ORAL 4 TIMES DAILY
Qty: 120 | Refills: 3 | Status: ACTIVE | COMMUNITY
Start: 2023-02-06 | End: 1900-01-01

## 2023-02-06 RX ORDER — CLOTRIMAZOLE AND BETAMETHASONE DIPROPIONATE 10; .5 MG/G; MG/G
1-0.05 CREAM TOPICAL TWICE DAILY
Qty: 1 | Refills: 3 | Status: ACTIVE | COMMUNITY
Start: 2023-02-06 | End: 1900-01-01

## 2023-02-06 RX ORDER — HYDROCORTISONE ACETATE 30 MG/1
30 SUPPOSITORY RECTAL
Qty: 30 | Refills: 3 | Status: ACTIVE | COMMUNITY
Start: 2023-02-06 | End: 1900-01-01

## 2023-02-06 NOTE — ASSESSMENT
[FreeTextEntry1] : Dyspepsia: The patient complains of dyspeptic symptoms.  The patient was advised to continue to abide by an anti-gas (low FOD-MAP) diet.  The patient was previously given a pamphlet for anti-gas (low FOD-MAP).  The patient and I reviewed the anti-gas (low FOD-MAP)diet at length again. The patient is to continue on a trial of Simethicone one tablet 4 times a day p.r.n. abdominal pain and gas.\par Rectal Pain: The patient had episodes of rectal pain.  The etiology of the rectal pain is unclear.  I recommend a trial of Anusol HC suppositories one per rectum QHS and Anusol HC 2.5% cream apply to affected area twice a day PRN hemorrhoidal bleeding or pain.  I recommend a trial of Calmoseptine cream apply to affected area twice a day for rectal discomfort. I recommend Tucks pads for the hemorrhoids.  I recommend starting Sitz baths twice a day for the hemorrhoids.  I recommend avoid wearing tight underwear and use boxers. I recommend avoid touching the perineal area. If the symptoms persist, I recommend a surgical evaluation for possible band ligation of the hemorrhoids. The patient was given the name of colorectal surgeon, Dr. Tin Peres for possible surgery. If the symptoms persist, the patient may require a colonoscopy to assess the site of bleeding. The patient was told of the risks and benefits of the procedure.  The patient was told of the risks of perforation, emergency surgery, bleeding, infections and missed lesions. The patient agrees and will follow up to assess the symptoms\par Prior Endoscopic Procedures: The patient had a prior colonoscopy performed by another gastroenterologist. I will try to obtain the prior colonoscopy reports. The patient is to sign a record release to obtain those records.\par Follow-up: The patient is to follow-up in the office in 3 months to reassess the symptoms. The patient was told to call the office if any further problems. \par

## 2023-02-06 NOTE — HISTORY OF PRESENT ILLNESS
[FreeTextEntry1] : The patient has a history significant for prostate cancer, s/p radiation therapy and being followed by Dr. Finn Lopez.  The patient is being followed by Dr. Topher Jeff. The patient states that he is feeling uncomfortable x several months. The patient denies any jaundice or pruritus.  The patient denies any chronic lower back pain. The patient denies any abdominal pain.  The patient complains of abdominal gas and bloating.  The patient denies any nausea or vomiting.  The patient denies any gastroesophageal reflux disease or dysphagia.  The patient denies any atypical chest pain, shortness of breath or palpitations.  The patient denies any constipation or diarrhea.  The patient has 1 bowel movement a day. The patient denies a change in bowel habits.  The patient denies a change in caliber of stool.  The patient denies having mucus discharge with the bowel movements.  The patient denies any bright red blood per rectum, melena or hematemesis.  The patient complains of rectal pain and rectal pruritus.  The patient complains of weight loss but denies any anorexia.  The patient admits to losing 8 pounds over the past 1 month. The patient attributes the weight loss to recent change in diet.   He denies any fevers or chills.  The patient admits to having a prior upper endoscopy and colonoscopy performed by another gastroenterologist over 5 years ago. According to the patient, the colonoscopic findings revealed a normal study. The patient denies any significant family history of GI problems. \par \par (+)prior  smoking 1 PPD x 4 years, stopped x 45 years, (-) ETOH, (-) IVDA\par

## 2023-02-08 ENCOUNTER — NON-APPOINTMENT (OUTPATIENT)
Age: 73
End: 2023-02-08

## 2023-04-05 ENCOUNTER — APPOINTMENT (OUTPATIENT)
Dept: UROLOGY | Facility: CLINIC | Age: 73
End: 2023-04-05
Payer: MEDICARE

## 2023-04-05 VITALS
TEMPERATURE: 97.2 F | DIASTOLIC BLOOD PRESSURE: 91 MMHG | SYSTOLIC BLOOD PRESSURE: 164 MMHG | HEART RATE: 62 BPM | WEIGHT: 214 LBS | BODY MASS INDEX: 26.61 KG/M2 | OXYGEN SATURATION: 99 % | HEIGHT: 75 IN

## 2023-04-05 PROCEDURE — 99213 OFFICE O/P EST LOW 20 MIN: CPT

## 2023-04-10 NOTE — HISTORY OF PRESENT ILLNESS
[FreeTextEntry1] : cc f/u \par h/o cap \par completed xrt 11/22\par has some urgency and some pain with defecation \par + hot flashes\par path 4+3\par psa 0.6 2/23

## 2023-05-08 ENCOUNTER — APPOINTMENT (OUTPATIENT)
Dept: GASTROENTEROLOGY | Facility: CLINIC | Age: 73
End: 2023-05-08
Payer: MEDICARE

## 2023-05-08 VITALS
BODY MASS INDEX: 26.36 KG/M2 | OXYGEN SATURATION: 98 % | WEIGHT: 212 LBS | DIASTOLIC BLOOD PRESSURE: 74 MMHG | TEMPERATURE: 97.4 F | SYSTOLIC BLOOD PRESSURE: 139 MMHG | HEIGHT: 75 IN | HEART RATE: 56 BPM

## 2023-05-08 DIAGNOSIS — R10.13 EPIGASTRIC PAIN: ICD-10-CM

## 2023-05-08 DIAGNOSIS — K62.89 OTHER SPECIFIED DISEASES OF ANUS AND RECTUM: ICD-10-CM

## 2023-05-08 DIAGNOSIS — K62.5 HEMORRHAGE OF ANUS AND RECTUM: ICD-10-CM

## 2023-05-08 DIAGNOSIS — K64.8 OTHER HEMORRHOIDS: ICD-10-CM

## 2023-05-08 PROCEDURE — 99204 OFFICE O/P NEW MOD 45 MIN: CPT

## 2023-05-08 PROCEDURE — 99214 OFFICE O/P EST MOD 30 MIN: CPT

## 2023-05-08 RX ORDER — HYDROCORTISONE 25 MG/G
2.5 CREAM TOPICAL 3 TIMES DAILY
Qty: 2 | Refills: 3 | Status: ACTIVE | COMMUNITY
Start: 2023-05-08 | End: 1900-01-01

## 2023-05-08 RX ORDER — HYDROCORTISONE ACETATE 30 MG/1
30 SUPPOSITORY RECTAL
Qty: 30 | Refills: 3 | Status: ACTIVE | COMMUNITY
Start: 2023-05-08 | End: 1900-01-01

## 2023-05-08 RX ORDER — POLYETHYLENE GLYCOL 3350 AND ELECTROLYTES WITH LEMON FLAVOR 236; 22.74; 6.74; 5.86; 2.97 G/4L; G/4L; G/4L; G/4L; G/4L
236 POWDER, FOR SOLUTION ORAL
Qty: 1 | Refills: 0 | Status: ACTIVE | COMMUNITY
Start: 2023-05-08 | End: 1900-01-01

## 2023-05-08 NOTE — ASSESSMENT
[FreeTextEntry1] : Dyspepsia: The patient complains of dyspeptic symptoms.  The patient was advised to continue to abide by an anti-gas (low FOD-MAP) diet.  The patient was previously given a pamphlet for anti-gas (low FOD-MAP).  The patient and I reviewed the anti-gas (low FOD-MAP)diet at length again. The patient is to continue on a trial of Simethicone one tablet 4 times a day p.r.n. abdominal pain and gas.\par Anal Pruritus: The patient had episodes of anal pruritus.  The etiology of the anal pruritus is unclear.  I recommend a trial of Anusol HC suppositories one per rectum QHS and Anusol HC 2.5% cream apply to affected area twice a day PRN hemorrhoidal bleeding or pain.  I recommend a trial of Calmoseptine cream apply to affected area twice a day for rectal discomfort. I recommend Tucks pads for the hemorrhoids.  I recommend starting Sitz baths twice a day for the hemorrhoids.  I recommend avoid wearing tight underwear and use boxers. I recommend avoid touching the perineal area. If the symptoms persist, I recommend a surgical evaluation for possible band ligation of the hemorrhoids. The patient was given the name of colorectal surgeon, Dr. Tin Peres for possible surgery.  The patient agrees and will follow up to assess the symptoms.\par Rectal Bleeding: The patient had episodes of rectal bleeding.  The etiology of the rectal bleeding is unclear.  I recommend a trial of Anusol HC suppositories one per rectum QHS and Anusol HC 2.5% cream apply to affected area twice a day PRN hemorrhoidal bleeding or pain.  I recommend a trial of Calmoseptine cream apply to affected area twice a day for rectal discomfort. I recommend Tucks pads for the hemorrhoids.  I recommend starting Sitz baths twice a day for the hemorrhoids.  I recommend avoid wearing tight underwear and use boxers. I recommend avoid touching the perineal area. \par If the symptoms persist, I recommend a surgical evaluation for possible band ligation of the hemorrhoids. The patient was given the name of colorectal surgeon, Dr. Tin Peres for possible surgery. I recommend a colonoscopy to assess the site of bleeding. The patient was told of the risks and benefits of the procedure.  The patient was told of the risks of perforation, emergency surgery, bleeding, infections and missed lesions.  The patient agreed and will schedule for the procedure. The patient is to be n.p.o. after midnight and bowel prep was given.  The patient is to return for the procedure.\par Colonoscopy: I recommend a colonoscopy to assess the symptoms and for colonic polyps.  The patient was told of the risks and benefits of the procedure.  The patient was told of the risks of perforation, emergency surgery, bleeding, infections and missed lesions.  The patient is to be on a clear liquid diet the entire day prior to the procedure. The patient is to complete the entire prescribed bowel prep the day prior to the procedure as directed. The patient is told not to drive, drink alcohol, use recreational drugs, exercise, or work the day of the procedure.  The patient was told of the need for an escort to accompany the patient home after the procedure. The patient is aware that the procedure may be cancelled if they fail to follow the directions.  The patient agreed and will schedule for the procedure. The patient can take the antihypertensive medication with a sip of water one hour prior to the procedure. The patient is to be n.p.o. after midnight and bowel prep was given.  The patient is to return for the procedure.\par Prior Endoscopic Procedures: The patient had a prior colonoscopy performed by another gastroenterologist. I will try to obtain the prior colonoscopy reports. The patient is to sign a record release to obtain those records.\par Follow-up: The patient is to follow-up in the office in 3 months to reassess the symptoms. The patient was told to call the office if any further problems. \par

## 2023-05-08 NOTE — HISTORY OF PRESENT ILLNESS
[FreeTextEntry1] : The patient has a history significant for prostate cancer, s/p radiation therapy and being followed by Dr. Finn Lopez. The patient is being followed by Dr. Topher Jeff. The patient states that he is feeling better. The patient denies any jaundice or pruritus.  The patient denies any chronic lower back pain. The patient denies any abdominal pain.  The patient complains of abdominal gas and bloating.  The patient denies any nausea or vomiting.  The patient denies any gastroesophageal reflux disease or dysphagia.  The patient denies any atypical chest pain, shortness of breath or palpitations.  The patient admits to occasional episodes of diaphoresis.  The patient denies any constipation or diarrhea.  The patient has 1 to 2 bowel movements a day. The patient denies a change in bowel habits.  The patient denies a change in caliber of stool.  The patient denies admits to having mucus discharge with the bowel movements.  The patient complains of occasional rectal bleeding but denies any melena or hematemesis.  The rectal bleeding is associated with hemorrhoids.  The patient complains of rectal pain and rectal pruritus.  The patient currently denies any weight loss or anorexia.  The patient admits to gaining weight recently.   The patient previously complained of weight loss but denied any anorexia. The patient admitted to losing 8 pounds over 1 month. The patient attributed the weight loss to recent change in diet. He denies any fevers or chills. The patient admits to having a prior upper endoscopy and colonoscopy performed by another gastroenterologist over 7 years ago. According to the patient, the colonoscopic findings revealed a normal study. The patient denies any significant family history of GI problems. \par \par (+)prior smoking 1 PPD x 4 years, stopped x 45 years, (-) ETOH, (-) IVDA\par

## 2023-05-18 ENCOUNTER — APPOINTMENT (OUTPATIENT)
Dept: SURGERY | Facility: CLINIC | Age: 73
End: 2023-05-18
Payer: MEDICARE

## 2023-05-18 VITALS
HEART RATE: 63 BPM | HEIGHT: 75 IN | BODY MASS INDEX: 26.36 KG/M2 | SYSTOLIC BLOOD PRESSURE: 160 MMHG | WEIGHT: 212 LBS | OXYGEN SATURATION: 100 % | TEMPERATURE: 96.2 F | DIASTOLIC BLOOD PRESSURE: 90 MMHG

## 2023-05-18 DIAGNOSIS — Z80.0 FAMILY HISTORY OF MALIGNANT NEOPLASM OF DIGESTIVE ORGANS: ICD-10-CM

## 2023-05-18 DIAGNOSIS — Z87.891 PERSONAL HISTORY OF NICOTINE DEPENDENCE: ICD-10-CM

## 2023-05-18 PROCEDURE — 99203 OFFICE O/P NEW LOW 30 MIN: CPT

## 2023-05-29 NOTE — ASSESSMENT
[FreeTextEntry1] : Mr. MORALES CLEMONS is a 72 year M presenting with anal pain most likely due to an anal fissure.

## 2023-05-29 NOTE — HISTORY OF PRESENT ILLNESS
[FreeTextEntry1] : Mr. MORALES CLEMONS is a 72 year M with a history of prostate cancer (s/p seeds RT), osteoarthritis, and hemorrhoids referred by Dr. Eric for anal pain here for an initial visit. He's had hemorrhoids in the past which come and go. However, a couple of weeks ago, he developed sharp anal pain which occurred during and after a BM. Per patient, pain was aggravated by radiation (and Nov/Dec 2022) and more recently with hormone injections. He does report that on reflection his BMs might have been harder for a short period after the injections. Anal pain has improved and is not noted during a BM but still experiences burning afterwards. He has been using hydrocortisone 2-3 times a day, sitz baths once daily and prep H suppositories which help with pain. He has daily soft BM without straining but never feels like he is finished after a BM. He reports he rarely sees bleeding. Denies taking fiber supplement, laxatives or stool softeners. Per patient, last colonoscopy in 2017 was normal. Patient has a colonoscopy scheduled for 8/17/2023.

## 2023-05-29 NOTE — PHYSICAL EXAM
[Normal Breath Sounds] : Normal breath sounds [Normal Heart Sounds] : normal heart sounds [Normal Rate and Rhythm] : normal rate and rhythm [Alert] : alert [Oriented to Person] : oriented to person [Oriented to Place] : oriented to place [Oriented to Time] : oriented to time [Tight] : was tight [Posterior] : posteriorly [Excoriation] : no perianal excoriation [Gross Blood] : no gross blood [JVD] : no jugular venous distention  [Wheezing] : no wheezing was heard [de-identified] : soft, non-distended, non-tender to palpation.  [de-identified] : Small AM skintag. PM pain on KAMINI. Internal Exam deferred. [de-identified] : posterior midline [de-identified] : Small AM skintag. [de-identified] : awake, alert, NAD  [de-identified] : normocephalic, atraumatic, EOMI, normal conjunctiva  [de-identified] : deferred [de-identified] : Normal strength  [de-identified] : Perianal as above  [de-identified] : normal mood and affect

## 2023-05-29 NOTE — REVIEW OF SYSTEMS
[Negative] : Neurological [As Noted in HPI] : as noted in HPI [Arthralgias] : arthralgias [Fever] : no fever [Chills] : no chills [Feeling Poorly] : not feeling poorly [Feeling Tired] : not feeling tired [Recent Weight Loss (___ Lbs)] : no recent weight loss [Eye Pain] : no eye pain [Earache] : no earache [Chest Pain] : no chest pain [Shortness Of Breath] : no shortness of breath [Abdominal Pain] : no abdominal pain [Vomiting] : no vomiting [Constipation] : no constipation [Diarrhea] : no diarrhea [Dysuria] : no dysuria [FreeTextEntry7] : anal pain [FreeTextEntry9] : osteoarthritis

## 2023-06-20 ENCOUNTER — APPOINTMENT (OUTPATIENT)
Dept: SURGERY | Facility: CLINIC | Age: 73
End: 2023-06-20

## 2023-08-01 ENCOUNTER — APPOINTMENT (OUTPATIENT)
Dept: SURGERY | Facility: CLINIC | Age: 73
End: 2023-08-01
Payer: MEDICARE

## 2023-08-01 VITALS
DIASTOLIC BLOOD PRESSURE: 50 MMHG | SYSTOLIC BLOOD PRESSURE: 152 MMHG | HEIGHT: 75 IN | WEIGHT: 220 LBS | HEART RATE: 70 BPM | BODY MASS INDEX: 27.35 KG/M2

## 2023-08-01 PROCEDURE — 99214 OFFICE O/P EST MOD 30 MIN: CPT

## 2023-08-01 NOTE — PHYSICAL EXAM
[Excoriation] : no perianal excoriation [Tight] : was tight [Posterior] : posteriorly [Gross Blood] : no gross blood [JVD] : no jugular venous distention  [Normal Breath Sounds] : Normal breath sounds [Wheezing] : no wheezing was heard [Normal Heart Sounds] : normal heart sounds [Normal Rate and Rhythm] : normal rate and rhythm [Alert] : alert [Oriented to Person] : oriented to person [Oriented to Place] : oriented to place [Oriented to Time] : oriented to time [de-identified] : soft, non-distended, non-tender to palpation.  [de-identified] : Small AM skintag. PM pain on KAMINI. Internal Exam deferred. [de-identified] : Small AM skintag. [de-identified] : awake, alert, NAD  [de-identified] : normocephalic, atraumatic, EOMI, normal conjunctiva  [de-identified] : deferred [de-identified] : Normal strength  [de-identified] : Perianal as above  [de-identified] : normal mood and affect

## 2023-08-01 NOTE — HISTORY OF PRESENT ILLNESS
[FreeTextEntry1] : Mr. MORALES CLEMONS is a 72y.o. M with a history of prostate cancer (s/p seeds RT and hormone therapy), osteoarthritis, and hemorrhoids referred by Dr. Eric for anal pain here for a follow-up visit. He's had hemorrhoids in the past which come and go. However, a couple of weeks ago, he developed sharp anal pain which occurred during and after a BM. Per patient, pain was aggravated by radiation (and Nov/Dec 2022) and more recently with hormone injections. He does report that on reflection his BMs might have been harder for a short period after the injections. Anal pain has improved and is not noted during a BM but still experiences burning afterwards. He has been using hydrocortisone 2-3 times a day, sitz baths once daily and prep H suppositories which help with pain. He has daily soft BM without straining but never feels like he is finished after a BM. He reports he rarely sees bleeding. Denies taking fiber supplement, laxatives or stool softeners. Per patient, last colonoscopy in 2017 was normal. Patient has a colonoscopy scheduled for 8/17/2023. Today pt reports some improvement in his pain but states it hasn't completely resolved. He has started daily metamucil pills in addition to increasing his dietary fiber and he is not having any hard stools or issues with constipation. He does also report that he had to move recently and had a lot of other things come up so he only started consistently using the nifedipine and doing the sitz baths the past 2wks.

## 2023-08-10 RX ORDER — MELOXICAM 15 MG/1
15 TABLET ORAL DAILY
Qty: 30 | Refills: 2 | Status: ACTIVE | COMMUNITY
Start: 2021-05-06 | End: 1900-01-01

## 2023-09-13 ENCOUNTER — OUTPATIENT (OUTPATIENT)
Dept: OUTPATIENT SERVICES | Facility: HOSPITAL | Age: 73
LOS: 1 days | End: 2023-09-13
Payer: MEDICARE

## 2023-09-13 ENCOUNTER — RESULT REVIEW (OUTPATIENT)
Age: 73
End: 2023-09-13

## 2023-09-13 ENCOUNTER — APPOINTMENT (OUTPATIENT)
Dept: ORTHOPEDIC SURGERY | Facility: CLINIC | Age: 73
End: 2023-09-13
Payer: MEDICARE

## 2023-09-13 VITALS
WEIGHT: 220 LBS | OXYGEN SATURATION: 98 % | SYSTOLIC BLOOD PRESSURE: 127 MMHG | BODY MASS INDEX: 27.35 KG/M2 | HEART RATE: 77 BPM | HEIGHT: 75 IN | DIASTOLIC BLOOD PRESSURE: 79 MMHG

## 2023-09-13 DIAGNOSIS — Z98.890 OTHER SPECIFIED POSTPROCEDURAL STATES: Chronic | ICD-10-CM

## 2023-09-13 PROCEDURE — 99214 OFFICE O/P EST MOD 30 MIN: CPT

## 2023-09-13 PROCEDURE — 73521 X-RAY EXAM HIPS BI 2 VIEWS: CPT

## 2023-09-13 PROCEDURE — 73521 X-RAY EXAM HIPS BI 2 VIEWS: CPT | Mod: 26

## 2023-09-15 RX ORDER — MELOXICAM 7.5 MG/1
7.5 TABLET ORAL DAILY
Qty: 30 | Refills: 2 | Status: ACTIVE | COMMUNITY
Start: 2023-09-15 | End: 1900-01-01

## 2023-09-19 RX ORDER — MELOXICAM 7.5 MG/1
7.5 TABLET ORAL
Qty: 30 | Refills: 2 | Status: ACTIVE | COMMUNITY
Start: 2023-09-19 | End: 1900-01-01

## 2023-09-21 ENCOUNTER — NON-APPOINTMENT (OUTPATIENT)
Age: 73
End: 2023-09-21

## 2023-10-17 ENCOUNTER — APPOINTMENT (OUTPATIENT)
Dept: GASTROENTEROLOGY | Facility: HOSPITAL | Age: 73
End: 2023-10-17

## 2023-11-09 ENCOUNTER — APPOINTMENT (OUTPATIENT)
Dept: UROLOGY | Facility: CLINIC | Age: 73
End: 2023-11-09
Payer: MEDICARE

## 2023-11-09 VITALS
HEIGHT: 75 IN | HEART RATE: 64 BPM | BODY MASS INDEX: 26.73 KG/M2 | WEIGHT: 215 LBS | OXYGEN SATURATION: 99 % | TEMPERATURE: 97.1 F | DIASTOLIC BLOOD PRESSURE: 71 MMHG | SYSTOLIC BLOOD PRESSURE: 123 MMHG

## 2023-11-09 DIAGNOSIS — C61 MALIGNANT NEOPLASM OF PROSTATE: ICD-10-CM

## 2023-11-09 PROCEDURE — 99213 OFFICE O/P EST LOW 20 MIN: CPT

## 2023-11-13 PROBLEM — C61 CARCINOMA OF PROSTATE: Status: ACTIVE | Noted: 2022-09-13

## 2023-11-13 LAB — PSA SERPL-MCNC: 0.62 NG/ML

## 2023-12-13 ENCOUNTER — APPOINTMENT (OUTPATIENT)
Dept: ORTHOPEDIC SURGERY | Facility: CLINIC | Age: 73
End: 2023-12-13
Payer: MEDICARE

## 2023-12-13 VITALS
DIASTOLIC BLOOD PRESSURE: 81 MMHG | WEIGHT: 215 LBS | HEART RATE: 64 BPM | BODY MASS INDEX: 26.73 KG/M2 | HEIGHT: 75 IN | OXYGEN SATURATION: 98 % | SYSTOLIC BLOOD PRESSURE: 121 MMHG

## 2023-12-13 PROCEDURE — 99214 OFFICE O/P EST MOD 30 MIN: CPT

## 2023-12-14 NOTE — DISCUSSION/SUMMARY
[de-identified] : 12/13/2023 72 y/o M with right greater than left hip osteoarthritis. - He remains indicated for right total hip arthroplasty. - We discussed the details of the procedure, the expected recovery period, and the expected outcome. We discussed the likelihood of satisfaction after complete recovery, and the potential causes of dissatisfaction. The importance of active patient participation in the rehabilitation protocol was emphasized, along with its influence on short and long-term outcomes. We discussed the risks, benefits, and alternatives of surgery at length. Specific risks of total hip replacement were discussed in detail. We discussed the risk of surgical site complications including but not limited to: surgical site infection, wound healing complications, bone fracture, tendon or ligament injury, neurovascular injury, hemorrhage, postoperative stiffness or instability, persistent pain, limb length discrepancy, and need for reoperation. We discussed surgical blood loss and the possible need for blood transfusion. We discussed the risk of perioperative medical complications, including but not limited to catheter-associated urinary tract infection, venous thromboembolism and other cardiopulmonary complications. We discussed anesthetic options and the risk of anesthesia-related complications. We discussed the potential benefits of surgery including the potential to improve the current clinical condition through operative intervention. I emphasized that there are alternatives to surgical intervention including continued conservative management, though such a course could yield less than optimal results in this particular patient. A model was used to demonstrate the operation and to discuss bearing surfaces of the implants. We discussed implant fixation methods; my plan would be to use fully cementless fixation in this case. We discussed the various surgical approaches to the hip; I think that an anterior approach would be appropriate in this case. We discussed that it is relatively common following anterior approach hip arthroplasty to develop numbness of the lateral thigh secondary to injury to the branches of the lateral femoral cutaneous nerve, which in most cases does improve over the course of the first postoperative year, but which can also be permanent. We discussed the durability of prosthetic hips and limitations related to wear, osteolysis and loosening. All questions were answered to the patient's satisfaction. The patient was given a copy of my preoperative packet with additional information about the procedure. I asked the patient to either call back or schedule a followup appointment for any additional questions or concerns regarding the procedure. - We will book the surgery for a convenient time with routine medical clearance, most likely in January per his preference. We will plan to employ multimodal stool softeners around the surgical episode to prevent constipation secondary to the anal fissure issues. - He was agreeable to PEPPER trial participation. Will follow up with research coordinators.

## 2023-12-14 NOTE — HISTORY OF PRESENT ILLNESS
[de-identified] : 12/13/2023 74 y/o M following up for right greater than left hip osteoarthritis. We last saw him three months ago at which time we indicated him for right total hip arthroplasty. He's here today to finalize his surgical plans. He reports no significant change in the symptoms. He does feel like the right hip is continuing to get worse over time. 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 He is currently on medical management for it and he does feel that it is gradually improving over time. No surgery has been recommended by the colorectal team and he does report that he is having regular bowel movements without too much difficulty using a high fiber diet.  09/13/2023: 71 y/o male following up for right hip osteoarthritis. We last saw him about a year and a half ago at which time he noted that right hip pain was relatively mild and well controlled. Over the course of the last year, he has been focusing mainly on his prostate cancer treatment and he's happy to report that his PSA has been steadily downtrending. However, he has noted persistently worsening right hip pain. Whereas previously he did not use any pain medications at all, he is now using Tylenol and Meloxicam on a daily basis. He does find these to be helpful at relieving the pain. He has been walking for exercise and has resumed work as a [employer] , which he notes involved standing as much as 4 hours per day indoors and outdoors. He notes minimal to no left hip discomfort.  5/11/22: Reports that the right hip is still minimally painful but he still experiencing relative weakness and stiffness particularly when rising from seated position. Has no limitations with regard to ambulatory tolerance or day to day activities; never needs assistive device. Has been using Tylenol and meloxicam as needed. Is undergoing workup for possible prostate CA and is prioritizing that right now.  1/31/22: Reports about 80-90% pain relief from right hip CSI on 12/1/21. Still doing very well; currently only has a bit of pain with initiation of movement that resolves after a few steps. Discontinued PT but is still doing HEP.   5/6/21: 69y/o male presenting for evaluation of progressive right hip pain and stiffness since Oct 2020. No injury. Symptoms occur daily but intensity is variable. Has been taking Tylenol with slight relief. Started PT three weeks ago, has had about a half dozen sessions so far; can't tell if it's helping. Has never had injections or surgery. Feels like the right leg is longer than the left. Exercise tolerance remains unlimited; thinks he can walk a mile without cane, though would expect to be stiff and sore by the end. Retired from employment at [employer]. No PMH other than diet-controlled HLD. He is here to discuss ALFRED.  He is the  of [place].

## 2023-12-14 NOTE — END OF VISIT
[FreeTextEntry3] : All medical record entries made by the Scribe were at my, Dr. Wilson Gaston's, direction and personally dictated by me on . I have reviewed the chart and agree that the record accurately reflects my personal performance of the history, physical exam, assessment and plan. I have also personally directed, reviewed, and agreed with the chart.

## 2023-12-14 NOTE — PHYSICAL EXAM
[de-identified] : General appearance: well nourished and hydrated, pleasant, alert and oriented x 3, cooperative. HEENT: normocephalic, EOM intact, wearing mask, external auditory canal clear. Cardiovascular: no lower leg edema, no varicosities, dorsalis pedis pulses palpable and symmetric. Lymphatics: no palpable lymphadenopathy, no lymphedema. Neurologic: sensation is normal, no muscle weakness in upper or lower extremities, patella tendon reflexes present and symmetric. Dermatologic: skin moist, warm, no rash. Spine: cervical spine with normal lordosis and painless range of motion, thoracic spine with normal kyphosis and painless range of motion, lumbosacral spine with normal lordosis and painless range of motion.  No tenderness to palpation along midline spine and paraspinal musculature.  Sacroiliac joints nontender bilaterally. Negative SLR and crossed SLR tests bilaterally. Gait: He presents today without the use of an assistive device. He's markedly slow to stand and get started. He demonstrates a marked right-sided limp.   Limb Lengths: Clinically, RLE approximately one centimeter shorter than the left.   Right hip: - Focal soft tissue swelling: none - Ecchymosis: none - Erythema: none - Wounds: none - Tenderness: none - ROM:   - Flexion: 90   - Extension: 0   - Adduction: to midline   - Abduction: 15   - Internal rotation in 90 degrees of hip flexion: 5 obligate external rotation   - External rotation in 90 degrees of hip flexion: 20 - BUZZ: stiff and painless - FADIR: painful and stiff - Elise: positive - Stinchfield: positive - Flexor power: 4/5 - Abductor power: 5/5

## 2023-12-28 ENCOUNTER — NON-APPOINTMENT (OUTPATIENT)
Age: 73
End: 2023-12-28

## 2024-01-14 ENCOUNTER — NON-APPOINTMENT (OUTPATIENT)
Age: 74
End: 2024-01-14

## 2024-01-23 ENCOUNTER — OUTPATIENT (OUTPATIENT)
Dept: OUTPATIENT SERVICES | Facility: HOSPITAL | Age: 74
LOS: 1 days | End: 2024-01-23
Payer: MEDICARE

## 2024-01-23 DIAGNOSIS — Z98.890 OTHER SPECIFIED POSTPROCEDURAL STATES: Chronic | ICD-10-CM

## 2024-01-23 DIAGNOSIS — Z01.818 ENCOUNTER FOR OTHER PREPROCEDURAL EXAMINATION: ICD-10-CM

## 2024-01-23 LAB
MRSA PCR RESULT.: NEGATIVE — SIGNIFICANT CHANGE UP
S AUREUS DNA NOSE QL NAA+PROBE: NEGATIVE — SIGNIFICANT CHANGE UP

## 2024-01-23 PROCEDURE — 93005 ELECTROCARDIOGRAM TRACING: CPT

## 2024-01-23 PROCEDURE — 93010 ELECTROCARDIOGRAM REPORT: CPT

## 2024-01-23 PROCEDURE — 87641 MR-STAPH DNA AMP PROBE: CPT

## 2024-01-23 PROCEDURE — 87640 STAPH A DNA AMP PROBE: CPT

## 2024-01-25 RX ORDER — PANTOPRAZOLE 40 MG/1
40 TABLET, DELAYED RELEASE ORAL DAILY
Qty: 30 | Refills: 2 | Status: ACTIVE | COMMUNITY
Start: 2024-01-25 | End: 1900-01-01

## 2024-01-25 RX ORDER — ACETAMINOPHEN 500 MG/1
500 TABLET ORAL
Qty: 180 | Refills: 1 | Status: ACTIVE | COMMUNITY
Start: 2024-01-25 | End: 1900-01-01

## 2024-01-25 RX ORDER — OXYCODONE 5 MG/1
5 TABLET ORAL
Qty: 50 | Refills: 0 | Status: ACTIVE | COMMUNITY
Start: 2024-01-25 | End: 1900-01-01

## 2024-01-25 RX ORDER — CELECOXIB 200 MG/1
200 CAPSULE ORAL TWICE DAILY
Qty: 60 | Refills: 2 | Status: ACTIVE | COMMUNITY
Start: 2024-01-25 | End: 1900-01-01

## 2024-01-26 VITALS
OXYGEN SATURATION: 98 % | TEMPERATURE: 98 F | SYSTOLIC BLOOD PRESSURE: 149 MMHG | DIASTOLIC BLOOD PRESSURE: 87 MMHG | HEIGHT: 75 IN | WEIGHT: 216.05 LBS | HEART RATE: 67 BPM | RESPIRATION RATE: 16 BRPM

## 2024-01-26 RX ORDER — BICALUTAMIDE 50 MG/1
1 TABLET, FILM COATED ORAL
Refills: 0 | DISCHARGE

## 2024-01-26 RX ORDER — ASPIRIN ENTERIC COATED TABLETS 81 MG 81 MG/1
81 TABLET, DELAYED RELEASE ORAL
Qty: 60 | Refills: 0 | Status: ACTIVE | COMMUNITY
Start: 2024-01-26 | End: 1900-01-01

## 2024-01-26 RX ORDER — HYDROCORTISONE 1 %
0 OINTMENT (GRAM) TOPICAL
Refills: 0 | DISCHARGE

## 2024-01-26 RX ORDER — HYDROCORTISONE 20 MG
0 TABLET ORAL
Refills: 0 | DISCHARGE

## 2024-01-26 RX ORDER — CLOTRIMAZOLE AND BETAMETHASONE DIPROPIONATE 10; .5 MG/G; MG/G
1 CREAM TOPICAL
Refills: 0 | DISCHARGE

## 2024-01-26 RX ORDER — SIMETHICONE 80 MG/1
1 TABLET, CHEWABLE ORAL
Refills: 0 | DISCHARGE

## 2024-01-26 RX ORDER — POVIDONE-IODINE 5 %
1 AEROSOL (ML) TOPICAL ONCE
Refills: 0 | Status: COMPLETED | OUTPATIENT
Start: 2024-01-29 | End: 2024-01-29

## 2024-01-26 NOTE — ASU PREOP CHECKLIST - ANTIBIOTIC
Recheck    Doing well, eats well, no complaints from surgery    G tube removed, tolerated well. Tract is burned with silver nitrate. Call prn.
n/a

## 2024-01-26 NOTE — H&P ADULT - NSHPLABSRESULTS_GEN_ALL_CORE
Preop CBC, BMP, PT/INR, PTT within normal range and reviewed per medical clearance  H/H: 12.5/38.2  Cr: 0.86  A1c: 5.5  UA: within normal limits and reviewed per medical clearance  Preop EKG within normal limits and reviewed per medical clearance  3M: DOS  MSSA/MRSA negative Preop CBC, BMP, PT/INR, PTT within normal range and reviewed per medical clearance  H/H: 12.5/38.2  Cr: 0.86  A1c: 5.5  UA: within normal limits and reviewed per medical clearance  Preop EKG within normal limits and reviewed per medical clearance  Povidone iodine nasal swab to be given day of surgery   MSSA/MRSA negative

## 2024-01-26 NOTE — ASU PATIENT PROFILE, ADULT - FALL HARM RISK - HARM RISK INTERVENTIONS

## 2024-01-26 NOTE — H&P ADULT - HISTORY OF PRESENT ILLNESS
73yoM with right hip pain x     Presents today for elective right total hip replacement with Dr. Gaston 72 y/o M with right hip pain despite conservative therapies for his symptoms. States recently has been ambulating with a cane. Denies history of DVT.     Presents today for elective right total hip replacement with Dr. Gaston

## 2024-01-26 NOTE — H&P ADULT - PROBLEM SELECTOR PLAN 1
Admit to Orthopaedic Service.  Presents today for elective right total hip replacement  Pt medically stable and cleared for procedure today by Dr. Tapia

## 2024-01-26 NOTE — ASU PATIENT PROFILE, ADULT - NSICDXPASTMEDICALHX_GEN_ALL_CORE_FT
PAST MEDICAL HISTORY:  Dyspepsia     History of rectal bleeding     Internal hemorrhoid     Osteoarthritis     Prostate CA     Rupture of Achilles tendon left    Wound drainage

## 2024-01-26 NOTE — H&P ADULT - NSHPPHYSICALEXAM_GEN_ALL_CORE
MSK: Decreased right hip ROM secondary to pain      Rest of PE per MD clearance MSK: Skin warm and well perfused. DP pulse palpable right lower extremity. Sensation intact and equal bilateral lower extremities. EHL/TA/GS/FHL 5/5 bilateral lower extremities. Decreased right hip ROM secondary to pain      Rest of PE per MD clearance

## 2024-01-28 ENCOUNTER — TRANSCRIPTION ENCOUNTER (OUTPATIENT)
Age: 74
End: 2024-01-28

## 2024-01-29 ENCOUNTER — APPOINTMENT (OUTPATIENT)
Dept: ORTHOPEDIC SURGERY | Facility: HOSPITAL | Age: 74
End: 2024-01-29

## 2024-01-29 ENCOUNTER — INPATIENT (INPATIENT)
Facility: HOSPITAL | Age: 74
LOS: 0 days | Discharge: HOME HEALTH | DRG: 470 | End: 2024-01-30
Attending: ORTHOPAEDIC SURGERY | Admitting: ORTHOPAEDIC SURGERY
Payer: MEDICARE

## 2024-01-29 DIAGNOSIS — K64.8 OTHER HEMORRHOIDS: ICD-10-CM

## 2024-01-29 DIAGNOSIS — M16.11 UNILATERAL PRIMARY OSTEOARTHRITIS, RIGHT HIP: ICD-10-CM

## 2024-01-29 DIAGNOSIS — Z98.890 OTHER SPECIFIED POSTPROCEDURAL STATES: Chronic | ICD-10-CM

## 2024-01-29 DIAGNOSIS — C61 MALIGNANT NEOPLASM OF PROSTATE: ICD-10-CM

## 2024-01-29 PROCEDURE — 72170 X-RAY EXAM OF PELVIS: CPT | Mod: 26

## 2024-01-29 PROCEDURE — 27130 TOTAL HIP ARTHROPLASTY: CPT | Mod: RT

## 2024-01-29 DEVICE — CEMENT SIMPLEX WITH TOBRAMYCIN: Type: IMPLANTABLE DEVICE | Site: RIGHT | Status: FUNCTIONAL

## 2024-01-29 DEVICE — SHELL ACET G7 OSSEOTI F HEMISPHR 4 HOLE 56MM: Type: IMPLANTABLE DEVICE | Site: RIGHT | Status: FUNCTIONAL

## 2024-01-29 DEVICE — SCREW ACET SELF TAP 6.5X20MM: Type: IMPLANTABLE DEVICE | Site: RIGHT | Status: FUNCTIONAL

## 2024-01-29 DEVICE — IMPLANTABLE DEVICE: Type: IMPLANTABLE DEVICE | Site: RIGHT | Status: FUNCTIONAL

## 2024-01-29 DEVICE — LINER ACET VIT E G7 NEUT SZ F 40MM: Type: IMPLANTABLE DEVICE | Site: RIGHT | Status: FUNCTIONAL

## 2024-01-29 DEVICE — PLUG CEM BUCK 30MM: Type: IMPLANTABLE DEVICE | Site: RIGHT | Status: FUNCTIONAL

## 2024-01-29 DEVICE — CELLERATE SURGICAL POWDER RX 5GM: Type: IMPLANTABLE DEVICE | Site: RIGHT | Status: FUNCTIONAL

## 2024-01-29 DEVICE — HEAD FEM BIOLOX CERAMIC 12/14 TAPR: Type: IMPLANTABLE DEVICE | Site: RIGHT | Status: FUNCTIONAL

## 2024-01-29 DEVICE — SCREW BONE SLF TAP 6.5X40MM: Type: IMPLANTABLE DEVICE | Site: RIGHT | Status: FUNCTIONAL

## 2024-01-29 RX ORDER — MAGNESIUM HYDROXIDE 400 MG/1
30 TABLET, CHEWABLE ORAL DAILY
Refills: 0 | Status: DISCONTINUED | OUTPATIENT
Start: 2024-01-29 | End: 2024-01-30

## 2024-01-29 RX ORDER — CHLORHEXIDINE GLUCONATE 213 G/1000ML
1 SOLUTION TOPICAL ONCE
Refills: 0 | Status: COMPLETED | OUTPATIENT
Start: 2024-01-29 | End: 2024-01-29

## 2024-01-29 RX ORDER — SODIUM CHLORIDE 9 MG/ML
1000 INJECTION, SOLUTION INTRAVENOUS
Refills: 0 | Status: DISCONTINUED | OUTPATIENT
Start: 2024-01-29 | End: 2024-01-30

## 2024-01-29 RX ORDER — HYDROMORPHONE HYDROCHLORIDE 2 MG/ML
0.5 INJECTION INTRAMUSCULAR; INTRAVENOUS; SUBCUTANEOUS EVERY 4 HOURS
Refills: 0 | Status: DISCONTINUED | OUTPATIENT
Start: 2024-01-29 | End: 2024-01-30

## 2024-01-29 RX ORDER — LANOLIN ALCOHOL/MO/W.PET/CERES
5 CREAM (GRAM) TOPICAL AT BEDTIME
Refills: 0 | Status: DISCONTINUED | OUTPATIENT
Start: 2024-01-29 | End: 2024-01-30

## 2024-01-29 RX ORDER — APREPITANT 80 MG/1
40 CAPSULE ORAL ONCE
Refills: 0 | Status: COMPLETED | OUTPATIENT
Start: 2024-01-29 | End: 2024-01-29

## 2024-01-29 RX ORDER — CEFAZOLIN SODIUM 1 G
2000 VIAL (EA) INJECTION ONCE
Refills: 0 | Status: COMPLETED | OUTPATIENT
Start: 2024-01-29 | End: 2024-01-29

## 2024-01-29 RX ORDER — ACETAMINOPHEN 500 MG
1000 TABLET ORAL ONCE
Refills: 0 | Status: COMPLETED | OUTPATIENT
Start: 2024-01-29 | End: 2024-01-29

## 2024-01-29 RX ORDER — OXYCODONE HYDROCHLORIDE 5 MG/1
5 TABLET ORAL
Refills: 0 | Status: DISCONTINUED | OUTPATIENT
Start: 2024-01-29 | End: 2024-01-30

## 2024-01-29 RX ORDER — POLYETHYLENE GLYCOL 3350 17 G/17G
17 POWDER, FOR SOLUTION ORAL AT BEDTIME
Refills: 0 | Status: DISCONTINUED | OUTPATIENT
Start: 2024-01-29 | End: 2024-01-30

## 2024-01-29 RX ORDER — ACETAMINOPHEN 500 MG
650 TABLET ORAL EVERY 6 HOURS
Refills: 0 | Status: DISCONTINUED | OUTPATIENT
Start: 2024-01-29 | End: 2024-01-30

## 2024-01-29 RX ORDER — SODIUM CHLORIDE 9 MG/ML
1000 INJECTION, SOLUTION INTRAVENOUS
Refills: 0 | Status: DISCONTINUED | OUTPATIENT
Start: 2024-01-30 | End: 2024-01-30

## 2024-01-29 RX ORDER — CELECOXIB 200 MG/1
400 CAPSULE ORAL ONCE
Refills: 0 | Status: COMPLETED | OUTPATIENT
Start: 2024-01-29 | End: 2024-01-29

## 2024-01-29 RX ORDER — OXYCODONE HYDROCHLORIDE 5 MG/1
10 TABLET ORAL
Refills: 0 | Status: DISCONTINUED | OUTPATIENT
Start: 2024-01-29 | End: 2024-01-30

## 2024-01-29 RX ORDER — AMLODIPINE AND ATORVASTATIN 5; 20 MG/1; MG/1
1 TABLET, FILM COATED ORAL
Refills: 0 | DISCHARGE

## 2024-01-29 RX ORDER — SENNA PLUS 8.6 MG/1
2 TABLET ORAL AT BEDTIME
Refills: 0 | Status: DISCONTINUED | OUTPATIENT
Start: 2024-01-29 | End: 2024-01-30

## 2024-01-29 RX ORDER — DOCUSATE SODIUM 100 MG
1 CAPSULE ORAL
Refills: 0 | DISCHARGE

## 2024-01-29 RX ORDER — ONDANSETRON 8 MG/1
4 TABLET, FILM COATED ORAL EVERY 6 HOURS
Refills: 0 | Status: DISCONTINUED | OUTPATIENT
Start: 2024-01-29 | End: 2024-01-30

## 2024-01-29 RX ORDER — HYDROMORPHONE HYDROCHLORIDE 2 MG/ML
0.5 INJECTION INTRAMUSCULAR; INTRAVENOUS; SUBCUTANEOUS
Refills: 0 | Status: DISCONTINUED | OUTPATIENT
Start: 2024-01-29 | End: 2024-01-30

## 2024-01-29 RX ORDER — HYDROMORPHONE HYDROCHLORIDE 2 MG/ML
0.5 INJECTION INTRAMUSCULAR; INTRAVENOUS; SUBCUTANEOUS
Refills: 0 | Status: DISCONTINUED | OUTPATIENT
Start: 2024-01-29 | End: 2024-01-29

## 2024-01-29 RX ADMIN — Medication 1000 MILLIGRAM(S): at 10:05

## 2024-01-29 RX ADMIN — SENNA PLUS 2 TABLET(S): 8.6 TABLET ORAL at 23:30

## 2024-01-29 RX ADMIN — CELECOXIB 400 MILLIGRAM(S): 200 CAPSULE ORAL at 10:11

## 2024-01-29 RX ADMIN — CHLORHEXIDINE GLUCONATE 1 APPLICATION(S): 213 SOLUTION TOPICAL at 10:04

## 2024-01-29 RX ADMIN — Medication 100 MILLIGRAM(S): at 23:40

## 2024-01-29 RX ADMIN — Medication 1 APPLICATION(S): at 10:11

## 2024-01-29 RX ADMIN — POLYETHYLENE GLYCOL 3350 17 GRAM(S): 17 POWDER, FOR SOLUTION ORAL at 23:30

## 2024-01-29 RX ADMIN — APREPITANT 40 MILLIGRAM(S): 80 CAPSULE ORAL at 10:05

## 2024-01-29 NOTE — PHYSICAL THERAPY INITIAL EVALUATION ADULT - IMPAIRED TRANSFERS: SIT/STAND, REHAB EVAL
----- Message from Keke Fine sent at 8/9/2019 12:13 PM CDT -----  Contact: [facility]  Type: RX Refill Request    Who Called:  [facility]  Refill or New Rx: refill    RX Name and Strength: alendronate (FOSAMAX) 70 MG tablet      Is this a 30 day or 90 day RX: 30 day    Preferred Pharmacy with phone number: [facility] [place], [place] - [facility] [phone] (Phone)  [phone] (Fax)        Local or Mail Order: Local    Ordering Provider: Dr. Hopkins  Would the patient rather a call back or a response via [facility]?  call  Best Call Back Number: [phone]         impaired balance/impaired postural control/decreased strength

## 2024-01-29 NOTE — PRE-ANESTHESIA EVALUATION ADULT - BMI (KG/M2)
[FreeTextEntry1] : herpes culture performed-will call if +    I don’t think this is herpetic as sx have resolved in 4 days and usually this virus lasts 2-3 weeks.  She does not want any lotriosone . 28

## 2024-01-29 NOTE — PHYSICAL THERAPY INITIAL EVALUATION ADULT - ADDITIONAL COMMENTS
Pt states he lives with his spouse in elevator building. Pt has SC He uses at time but otherwise independent with ADLs and amb.

## 2024-01-29 NOTE — PROGRESS NOTE ADULT - SUBJECTIVE AND OBJECTIVE BOX
Ortho Post Op Check    Procedure: R THR  Surgeon: Oh    Pt comfortable without complaints, pain controlled  Denies CP, SOB, N/V, numbness/tingling     Vital Signs Last 24 Hrs  T(C): 36.6 (01-29-24 @ 20:00), Max: 36.6 (01-29-24 @ 18:15)  T(F): 97.8 (01-29-24 @ 20:00), Max: 97.8 (01-29-24 @ 18:15)  HR: 68 (01-29-24 @ 21:00) (50 - 68)  BP: 148/80 (01-29-24 @ 21:00) (122/67 - 158/80)  BP(mean): 108 (01-29-24 @ 21:00) (88 - 111)  RR: 12 (01-29-24 @ 21:00) (12 - 20)  SpO2: 100% (01-29-24 @ 21:00) (97% - 100%)  I&O's Summary    29 Jan 2024 07:01  -  29 Jan 2024 22:14  --------------------------------------------------------  IN: 500 mL / OUT: 550 mL / NET: -50 mL        Physical Exam:  General: Pt Alert and oriented, NAD  RLE:  DSG C/D/I, prevena  Pulses: 2+  pulses, wwp, cap refill <3 sec  Sensation: SILT in sural/saph/sp/dp/tibial distributions  Motor: EHL/FHL/TA/GS firing              Post-op X-Ray: hardware in place well aligned    A/P: 73yMale s/p R THR with Dr. Gaston on 1/29  - Stable  - Pain Control  - f/u AM labs  - DVT ppx: SCDs, ASA  - Post op abx: periop ancef  - PT, WBS: wbat  - Dispo: Home PT pending clearance    Bruce Linares, PGY2  Orthopaedic Surgery  [phone]

## 2024-01-29 NOTE — PHYSICAL THERAPY INITIAL EVALUATION ADULT - PERTINENT HX OF CURRENT PROBLEM, REHAB EVAL
74 y/o M with right hip pain despite conservative therapies for his symptoms. States recently has been ambulating with a cane. Denies history of DVT.     Presents today for elective right total hip replacement with Dr. Gaston

## 2024-01-29 NOTE — PHYSICAL THERAPY INITIAL EVALUATION ADULT - BALANCE DISTURBANCE, SYSTEM IMPAIRMENT CONTRIBUTE, REHAB EVAL
Taclonex Approved    Prior Authorization Number: pharmacy has  Dates of approval: 02/13/2020-02/11/2021  Filling Pharmacy: [facility]  Additional Information: Pharmacy contacted - received paid claim. Pharmacy will contact patient when medication is ready to be picked up. musculoskeletal

## 2024-01-30 ENCOUNTER — TRANSCRIPTION ENCOUNTER (OUTPATIENT)
Age: 74
End: 2024-01-30

## 2024-01-30 VITALS
TEMPERATURE: 98 F | SYSTOLIC BLOOD PRESSURE: 103 MMHG | RESPIRATION RATE: 16 BRPM | HEART RATE: 58 BPM | DIASTOLIC BLOOD PRESSURE: 61 MMHG | OXYGEN SATURATION: 97 %

## 2024-01-30 LAB
ANION GAP SERPL CALC-SCNC: 10 MMOL/L — SIGNIFICANT CHANGE UP (ref 5–17)
BILIRUB SERPL-MCNC: 0.5 MG/DL — SIGNIFICANT CHANGE UP (ref 0.2–1.2)
BUN SERPL-MCNC: 11 MG/DL — SIGNIFICANT CHANGE UP (ref 7–23)
CALCIUM SERPL-MCNC: 9.3 MG/DL — SIGNIFICANT CHANGE UP (ref 8.4–10.5)
CHLORIDE SERPL-SCNC: 107 MMOL/L — SIGNIFICANT CHANGE UP (ref 96–108)
CO2 SERPL-SCNC: 22 MMOL/L — SIGNIFICANT CHANGE UP (ref 22–31)
CREAT SERPL-MCNC: 0.84 MG/DL — SIGNIFICANT CHANGE UP (ref 0.5–1.3)
EGFR: 93 ML/MIN/1.73M2 — SIGNIFICANT CHANGE UP
GLUCOSE SERPL-MCNC: 119 MG/DL — HIGH (ref 70–99)
HCT VFR BLD CALC: 33.7 % — LOW (ref 39–50)
HCV AB S/CO SERPL IA: 0.03 S/CO — SIGNIFICANT CHANGE UP
HCV AB SERPL-IMP: SIGNIFICANT CHANGE UP
HGB BLD-MCNC: 11.4 G/DL — LOW (ref 13–17)
INR BLD: 1.13 — SIGNIFICANT CHANGE UP (ref 0.85–1.18)
MCHC RBC-ENTMCNC: 24.4 PG — LOW (ref 27–34)
MCHC RBC-ENTMCNC: 33.8 GM/DL — SIGNIFICANT CHANGE UP (ref 32–36)
MCV RBC AUTO: 72.2 FL — LOW (ref 80–100)
MELD SCORE WITH DIALYSIS: 21 POINTS — SIGNIFICANT CHANGE UP
MELD SCORE WITHOUT DIALYSIS: 8 POINTS — SIGNIFICANT CHANGE UP
NRBC # BLD: 0 /100 WBCS — SIGNIFICANT CHANGE UP (ref 0–0)
PLATELET # BLD AUTO: 236 K/UL — SIGNIFICANT CHANGE UP (ref 150–400)
POTASSIUM SERPL-MCNC: 4.6 MMOL/L — SIGNIFICANT CHANGE UP (ref 3.5–5.3)
POTASSIUM SERPL-SCNC: 4.6 MMOL/L — SIGNIFICANT CHANGE UP (ref 3.5–5.3)
PROTHROM AB SERPL-ACNC: 12.8 SEC — SIGNIFICANT CHANGE UP (ref 9.5–13)
RBC # BLD: 4.67 M/UL — SIGNIFICANT CHANGE UP (ref 4.2–5.8)
RBC # FLD: 13.5 % — SIGNIFICANT CHANGE UP (ref 10.3–14.5)
SODIUM SERPL-SCNC: 139 MMOL/L — SIGNIFICANT CHANGE UP (ref 135–145)
WBC # BLD: 13.43 K/UL — HIGH (ref 3.8–10.5)
WBC # FLD AUTO: 13.43 K/UL — HIGH (ref 3.8–10.5)

## 2024-01-30 PROCEDURE — C1889: CPT

## 2024-01-30 PROCEDURE — 86901 BLOOD TYPING SEROLOGIC RH(D): CPT

## 2024-01-30 PROCEDURE — C1776: CPT

## 2024-01-30 PROCEDURE — 99222 1ST HOSP IP/OBS MODERATE 55: CPT

## 2024-01-30 PROCEDURE — 85027 COMPLETE CBC AUTOMATED: CPT

## 2024-01-30 PROCEDURE — 97116 GAIT TRAINING THERAPY: CPT

## 2024-01-30 PROCEDURE — 86900 BLOOD TYPING SEROLOGIC ABO: CPT

## 2024-01-30 PROCEDURE — 86850 RBC ANTIBODY SCREEN: CPT

## 2024-01-30 PROCEDURE — 72170 X-RAY EXAM OF PELVIS: CPT

## 2024-01-30 PROCEDURE — 76000 FLUOROSCOPY <1 HR PHYS/QHP: CPT

## 2024-01-30 PROCEDURE — 36415 COLL VENOUS BLD VENIPUNCTURE: CPT

## 2024-01-30 PROCEDURE — 80048 BASIC METABOLIC PNL TOTAL CA: CPT

## 2024-01-30 PROCEDURE — C1713: CPT

## 2024-01-30 PROCEDURE — 97162 PT EVAL MOD COMPLEX 30 MIN: CPT

## 2024-01-30 PROCEDURE — 86803 HEPATITIS C AB TEST: CPT

## 2024-01-30 RX ORDER — SENNA PLUS 8.6 MG/1
2 TABLET ORAL
Qty: 0 | Refills: 0 | DISCHARGE
Start: 2024-01-30

## 2024-01-30 RX ORDER — PSYLLIUM SEED (WITH DEXTROSE)
1 POWDER (GRAM) ORAL DAILY
Refills: 0 | Status: DISCONTINUED | OUTPATIENT
Start: 2024-01-30 | End: 2024-01-30

## 2024-01-30 RX ORDER — ASPIRIN/CALCIUM CARB/MAGNESIUM 324 MG
81 TABLET ORAL DAILY
Refills: 0 | Status: DISCONTINUED | OUTPATIENT
Start: 2024-01-30 | End: 2024-01-30

## 2024-01-30 RX ORDER — ACETAMINOPHEN 500 MG
2 TABLET ORAL
Qty: 0 | Refills: 0 | DISCHARGE
Start: 2024-01-30

## 2024-01-30 RX ORDER — ASPIRIN/CALCIUM CARB/MAGNESIUM 324 MG
1 TABLET ORAL
Qty: 0 | Refills: 0 | DISCHARGE
Start: 2024-01-30

## 2024-01-30 RX ORDER — POLYETHYLENE GLYCOL 3350 17 G/17G
17 POWDER, FOR SOLUTION ORAL
Qty: 0 | Refills: 0 | DISCHARGE
Start: 2024-01-30

## 2024-01-30 RX ORDER — MELOXICAM 15 MG/1
1 TABLET ORAL
Qty: 0 | Refills: 0 | DISCHARGE

## 2024-01-30 RX ORDER — OXYCODONE HYDROCHLORIDE 5 MG/1
1 TABLET ORAL
Qty: 0 | Refills: 0 | DISCHARGE
Start: 2024-01-30

## 2024-01-30 RX ORDER — ASPIRIN/CALCIUM CARB/MAGNESIUM 324 MG
81 TABLET ORAL
Refills: 0 | Status: DISCONTINUED | OUTPATIENT
Start: 2024-01-30 | End: 2024-01-30

## 2024-01-30 RX ORDER — PANTOPRAZOLE SODIUM 20 MG/1
40 TABLET, DELAYED RELEASE ORAL
Refills: 0 | Status: DISCONTINUED | OUTPATIENT
Start: 2024-01-30 | End: 2024-01-30

## 2024-01-30 RX ORDER — CELECOXIB 200 MG/1
1 CAPSULE ORAL
Qty: 0 | Refills: 0 | DISCHARGE

## 2024-01-30 RX ORDER — INFLUENZA VIRUS VACCINE 15; 15; 15; 15 UG/.5ML; UG/.5ML; UG/.5ML; UG/.5ML
0.7 SUSPENSION INTRAMUSCULAR ONCE
Refills: 0 | Status: DISCONTINUED | OUTPATIENT
Start: 2024-01-30 | End: 2024-01-30

## 2024-01-30 RX ORDER — PANTOPRAZOLE SODIUM 20 MG/1
1 TABLET, DELAYED RELEASE ORAL
Qty: 0 | Refills: 0 | DISCHARGE
Start: 2024-01-30

## 2024-01-30 RX ORDER — LIDOCAINE 4 G/100G
1 CREAM TOPICAL
Refills: 0 | DISCHARGE

## 2024-01-30 RX ADMIN — Medication 81 MILLIGRAM(S): at 17:36

## 2024-01-30 RX ADMIN — Medication 1 PACKET(S): at 12:41

## 2024-01-30 NOTE — DISCHARGE NOTE PROVIDER - NSDCMRMEDTOKEN_GEN_ALL_CORE_FT
acetaminophen 325 mg oral tablet: 2 tab(s) orally every 6 hours As needed Mild Pain (1 - 3)  amlodipine-atorvastatin 5 mg-20 mg oral tablet: 1 tab(s) orally  aspirin 81 mg oral tablet, chewable: 1 tab(s) orally once a day  docusate sodium 100 mg oral capsule: 1 cap(s) orally as needed for prn  meloxicam 7.5 mg oral tablet: 1 tab(s) orally every 12 hours  oxyCODONE 5 mg oral tablet: 1 tab(s) orally every 4 to 6 hours as needed for  severe pain  polyethylene glycol 3350 oral powder for reconstitution: 17 gram(s) orally once a day (at bedtime)  senna leaf extract oral tablet: 2 tab(s) orally once a day (at bedtime)   acetaminophen 325 mg oral tablet: 2 tab(s) orally every 6 hours As needed Mild Pain (1 - 3)  amlodipine-atorvastatin 5 mg-20 mg oral tablet: 1 tab(s) orally  aspirin 81 mg oral delayed release tablet: 1 tab(s) orally 2 times a day  CeleBREX 200 mg oral capsule: 1 cap(s) orally every 12 hours  docusate sodium 100 mg oral capsule: 1 cap(s) orally as needed for prn  oxyCODONE 5 mg oral tablet: 1 tab(s) orally every 6 hours as needed for  severe pain Take 1-2 tablets orally every 6 hours as needed for severe pain  pantoprazole 40 mg oral delayed release tablet: 1 tab(s) orally once a day (before a meal)  polyethylene glycol 3350 oral powder for reconstitution: 17 gram(s) orally once a day (at bedtime)  senna leaf extract oral tablet: 2 tab(s) orally once a day (at bedtime)

## 2024-01-30 NOTE — DISCHARGE NOTE PROVIDER - HOSPITAL COURSE
Admitted to Orthopedic service on 1/29/24  Surgery- Right total hip replacement   Jemma-op Antibiotics  Pain control  DVT prophylaxis  OOB/Physical Therapy

## 2024-01-30 NOTE — DISCHARGE NOTE NURSING/CASE MANAGEMENT/SOCIAL WORK - NURSING SECTION COMPLETE
-Last refill: 03/17/22  -Last visit: 03/17/22  -Next visit: 11/07/22 (visit with Teresa Bird NP)    Patient is requiring PA for Nurtec. Patient/Caregiver provided printed discharge information.

## 2024-01-30 NOTE — DISCHARGE NOTE PROVIDER - NSDCFUADDINST_GEN_ALL_CORE_FT
Please follow Dr. Gaston’s instruction sheets*****  ACTIVITY:   - Weight bear as tolerated with assistive device. No strenuous activity, heavy lifting, driving or returning to work until cleared by MD.   - Apply a cold compress to the surgical site several times daily to reduce pain and swelling. For icing, twenty-minute sessions followed by an hour off is recommended. You should ice as frequently as possible. Ice should NEVER be placed directly on the skin. Wearing compression stockings during the first week after surgery can help reduce swelling in your knee, calf and foot, but is not required.     (ANTERIOR HIP REPLACEMENTS)   Hip precautions:   · There are no specific range of motion precautions required with this approach to hip replacement.   · A raised toilet seat is not required, although you may find it easier to use one.   · You do not need to sleep with a pillow between your legs.     DRESSING/SHOWERING:   (PREVENA– incisional wound vac)   - You have an incisional wound vac dressing with tubing to attached canister/battery pack. You may shower but must keep battery pack dry at all times. The battery dies in 7 days then can remove dressing and leave open to air. Keep your incision clean and dry. Do not pick at your incision. Do not apply creams, ointments or oils to your incision until cleared by your surgeon. Do not soak your incision in sitting water (ie tubs, pools, lakes, etc.) until cleared by your surgeon. Do not scrub the incision – instead, allow soap and water to flow over the incision and then pat it dry with a clean towel.     MEDICATION/ANTICOAGULATION:   -You have been prescribed Aspirin, as a preventative to help prevent postoperative blood clots. Please take this medication as prescribed.    - You have been prescribed medications for pain:     - Tylenol for mild to moderate pain. Do not exceed 3,000mg daily.     - For more severe pain, take Tylenol with the addition of narcotic pain medication. Take this medication as prescribed. This medication may cause drowsiness or dizziness. Do not operate machinery. This medication may cause constipation.   - For any additional medications, follow instructions on the bottle.    -Try to have regular bowel movements. Take stool softener or laxative if necessary. You may wish to take Miralax daily until you have regular bowel movements.    - If you have been prescribed Aspirin or an anti-inflammatory, please take pantoprazole once a day, before breakfast, until no longer taking Aspirin or anti-inflammatory. This will help protect your stomach.   - If you have a pain management physician, please follow-up with them postoperatively.    - If you experience any negative side effects of your medications, please call your surgeon's office to discuss.      FOLLOW-UP:   - Call to schedule an appt with Dr. Gaston for follow up.   - Please follow-up with your primary care physician or any other specialist you see postoperatively, if needed.    - Contact your doctor or go to the emergency room if you experience: fever greater than 101.5, chills, chest pain, difficulty breathing, redness or excessive drainage around the incision, other concerns.

## 2024-01-30 NOTE — PROGRESS NOTE ADULT - SUBJECTIVE AND OBJECTIVE BOX
Ortho Note    Pt comfortable without complaints, pain controlled  Denies CP, SOB, N/V, numbness/tingling     Vital Signs Last 24 Hrs  T(C): 36.5 (01-30-24 @ 04:58), Max: 36.5 (01-30-24 @ 04:58)  T(F): 97.7 (01-30-24 @ 04:58), Max: 97.7 (01-30-24 @ 04:58)  HR: 67 (01-30-24 @ 04:58) (67 - 67)  BP: 118/73 (01-30-24 @ 04:58) (118/73 - 118/73)  BP(mean): --  RR: 16 (01-30-24 @ 04:58) (16 - 16)  SpO2: 97% (01-30-24 @ 04:58) (97% - 97%)  I&O's Summary    29 Jan 2024 07:01  -  30 Jan 2024 07:00  --------------------------------------------------------  IN: 500 mL / OUT: 950 mL / NET: -450 mL      Physical Exam:  General: Pt Alert and oriented, NAD  RLE:  DSG C/D/I, prevena  Pulses: 2+  pulses, wwp, cap refill <3 sec  Sensation: SILT in sural/saph/sp/dp/tibial distributions  Motor: EHL/FHL/TA/GS firing         Labs                    11.4   13.43 )-----------( 236      ( 30 Jan 2024 05:30 )             33.7             A/P: 73yMale s/p R THR with Dr. Gaston on 1/29  - Stable  - Pain Control  - f/u AM labs  - DVT ppx: SCDs, ASA  - Post op abx: periop ancef  - PT, WBS: wbat  - Dispo: Home PT pending clearance      Orthopaedic Surgery  [phone]

## 2024-01-30 NOTE — DISCHARGE NOTE PROVIDER - NS AS DC PROVIDER CONTACT Y/N MULTI
Treatment Goal Explanation (Does Not Render In The Note): Stable for the purposes of categorizing medical decision making is defined by the specific treatment goals for an individual patient. A patient that is not at their treatment goal is not stable, even if the condition has not changed and there is no short- term threat to life or function.
Yes

## 2024-01-30 NOTE — CONSULT NOTE ADULT - SUBJECTIVE AND OBJECTIVE BOX
See below for orthopedics HPI  "72 y/o M with right hip pain despite conservative therapies for his symptoms. States recently has been ambulating with a cane. Denies history of DVT.     Presents today for elective right total hip replacement with Dr. Gaston      Review of Systems:  Review of Systems: MSK: + right hip pain  Other Review of Systems: All other review of systems negative, except as noted in HPI      Allergies and Intolerances:        Allergies:  	No Known Allergies:     Home Medications:   * Patient Currently Takes Medications as of 31-May-2018 12:35 documented in Structured Notes  · 	doxycycline hyclate 100 mg oral tablet: 1 tab(s) orally 2 times a day   · 	bisacodyl 5 mg oral delayed release tablet: 1 tab(s) orally every 12 hours, As needed, Constipation  · 	polyethylene glycol 3350 oral powder for reconstitution: 17 gram(s) orally once a day  · 	bisacodyl 10 mg rectal suppository: 1 suppository(ies) rectal once a day, As needed, If no bowel movement  · 	docusate sodium 100 mg oral capsule: 1 cap(s) orally 3 times a day  · 	oxyCODONE 5 mg oral tablet: 1 tab(s) orally every 4 hours, As needed, Moderate Pain (4 - 6).  You have prescription from Dr. Dexter at home  · 	acetaminophen 325 mg oral tablet: 2 tab(s) orally every 6 hours, As needed, For Temp greater than 38 C (100.4 F) or mild pain (1-3).   Do not take more than 3,250mg in a day.  Do not take in the same 4 hours as oxycodone/acetaminophen  · 	aspirin 325 mg oral tablet: 1 tab(s) orally 2 times a day  	Take for 4 weeks after surgery to prevent blood clots  · 	lidocaine 5% topical ointment: Last Dose Taken: 29-Jan-2024 AM, Apply topically to affected area  · 	docusate sodium 100 mg oral capsule: Last Dose Taken: 28-Jan-2024 PM, 1 cap(s) orally as needed for prn  · 	meloxicam 7.5 mg oral tablet: Last Dose Taken: 26-Jan-2024 , 1 tab(s) orally taken twice a week  · 	amlodipine-atorvastatin 5 mg-20 mg oral tablet: Last Dose Taken: 26-Jan-2024 , 1 tab(s) orally    Patient History:   Past Medical, Past Surgical, and Family History:  PAST MEDICAL HISTORY:  No significant past medical history.     PAST SURGICAL HISTORY:  S/P Achilles tendon repair left.    Social History:  · Substance use	No   · Social History (marital status, living situation, occupation, and sexual history)	per chart review former cigarette smoker , denies etoh use    "    Vital Signs Last 24 Hrs  T(C): 36.4 (30 Jan 2024 08:45), Max: 36.6 (29 Jan 2024 18:15)  T(F): 97.6 (30 Jan 2024 08:45), Max: 97.8 (29 Jan 2024 18:15)  HR: 58 (30 Jan 2024 08:45) (50 - 68)  BP: 113/64 (30 Jan 2024 11:30) (104/57 - 158/80)  BP(mean): 108 (29 Jan 2024 21:00) (88 - 111)  RR: 15 (30 Jan 2024 08:45) (12 - 20)  SpO2: 98% (30 Jan 2024 08:45) (97% - 100%)    Parameters below as of 30 Jan 2024 08:45  Patient On (Oxygen Delivery Method): room air    Physical exam  General: no acute distress, sitting up in bed  HEENT: ncat, mmm  cards: rrr, normal S1S2, no mrg  pulm: ctab, no wheeze, crackles, rales or rhonchi  ab: soft, nontender, nondistended, normoactive bowel sounds  ext: wwp, no calf asymmetry  Neuro: speech is fluent, no facial asymmetry, follows commands  Skin: warm and dry, no obvious rashes or lesions present

## 2024-01-30 NOTE — DISCHARGE NOTE PROVIDER - CARE PROVIDER_API CALL
Wilson Gaston  Joint Reconstruction  [address]  [place] [address]  Phone: [phone]  Fax: [phone]  Established Patient  Follow Up Time: 2 weeks

## 2024-01-30 NOTE — DISCHARGE NOTE NURSING/CASE MANAGEMENT/SOCIAL WORK - PATIENT PORTAL LINK FT
You can access the FollowMyHealth Patient Portal offered by [facility] by registering at the following website: [email or web address]. By joining [facility]’s FollowMyHealth portal, you will also be able to view your health information using other applications (apps) compatible with our system.

## 2024-01-30 NOTE — CONSULT NOTE ADULT - ASSESSMENT
73M with PMH of prostate cancer (treated), anal fissures, and HTN presenting for elective R total hip replacement.     #post-operative state  - doing well today, will be discharged today - cleared by PT    #Microcytic anemia - noted on pre-operative labs on 1/16  - noted to have MCV of 72 on POD1, and MCV of ~74 on outpatient preoperative labs.  PCP also checked iron levels, and iron saturation was mildly low as was total iron.  No ferritin ordered.  As this was checked in the outpatient setting, can be followed up in the outpatient setting and discussed with patient.  He had been suffering from constipation and anal fissures, and had been on a very meat heavy diet.  Now he basically doesn't eat any red meat, so low daily consumption of dietary iron.  He had a colonoscopy ~7 years ago, and reports that it was OK.  Denies any bleeding or dark/black stools.  Would encourage outpatient follow up, testing for ferritin and discussion between patient and PCP regarding starting oral iron supplementation.  Due to significant issues with constipation, should be on bowel regimen if started on iron.      #Prostate cancer  - treated, outpatient follow up    #HTN  - can resume home medications    Would recommend labs checked post-operatively in one week.      Moderate level of medical decision making required for this case, including the presence of two chronic medical issues (prostate cancer and HTN) and discussion of plan with the orthopedic team.

## 2024-01-30 NOTE — PATIENT PROFILE ADULT - FALL HARM RISK - HARM RISK INTERVENTIONS
Assistance with ambulation/Assistance OOB with selected safe patient handling equipment/Communicate Risk of Fall with Harm to all staff/Discuss with provider need for PT consult/Monitor gait and stability/Provide patient with walking aids - walker, cane, crutches/Reinforce activity limits and safety measures with patient and family/Sit up slowly, dangle for a short time, stand at bedside before walking/Tailored Fall Risk Interventions/Use of alarms - bed, chair and/or voice tab/Visual Cue: Yellow wristband and red socks/Bed in lowest position, wheels locked, appropriate side rails in place/Call bell, personal items and telephone in reach/Instruct patient to call for assistance before getting out of bed or chair/Non-slip footwear when patient is out of bed/[place] to call system/Physically safe environment - no spills, clutter or unnecessary equipment/Purposeful Proactive Rounding/Room/bathroom lighting operational, light cord in reach

## 2024-02-01 RX ORDER — DICLOFENAC SODIUM 1% 10 MG/G
1 GEL TOPICAL
Qty: 100 | Refills: 2 | Status: ACTIVE | COMMUNITY
Start: 2024-02-01 | End: 1900-01-01

## 2024-02-02 DIAGNOSIS — D50.9 IRON DEFICIENCY ANEMIA, UNSPECIFIED: ICD-10-CM

## 2024-02-02 DIAGNOSIS — M16.11 UNILATERAL PRIMARY OSTEOARTHRITIS, RIGHT HIP: ICD-10-CM

## 2024-02-02 DIAGNOSIS — K59.00 CONSTIPATION, UNSPECIFIED: ICD-10-CM

## 2024-02-02 DIAGNOSIS — Z85.46 PERSONAL HISTORY OF MALIGNANT NEOPLASM OF PROSTATE: ICD-10-CM

## 2024-02-02 DIAGNOSIS — K64.8 OTHER HEMORRHOIDS: ICD-10-CM

## 2024-02-02 DIAGNOSIS — Z79.82 LONG TERM (CURRENT) USE OF ASPIRIN: ICD-10-CM

## 2024-02-02 DIAGNOSIS — I10 ESSENTIAL (PRIMARY) HYPERTENSION: ICD-10-CM

## 2024-02-12 ENCOUNTER — APPOINTMENT (OUTPATIENT)
Dept: ORTHOPEDIC SURGERY | Facility: CLINIC | Age: 74
End: 2024-02-12

## 2024-02-20 PROBLEM — S86.019A STRAIN OF UNSPECIFIED ACHILLES TENDON, INITIAL ENCOUNTER: Chronic | Status: ACTIVE | Noted: 2024-01-26

## 2024-02-20 PROBLEM — C61 MALIGNANT NEOPLASM OF PROSTATE: Chronic | Status: ACTIVE | Noted: 2024-01-26

## 2024-02-20 PROBLEM — K64.8 OTHER HEMORRHOIDS: Chronic | Status: ACTIVE | Noted: 2024-01-26

## 2024-02-20 PROBLEM — M19.90 UNSPECIFIED OSTEOARTHRITIS, UNSPECIFIED SITE: Chronic | Status: ACTIVE | Noted: 2024-01-26

## 2024-02-20 PROBLEM — Z87.19 PERSONAL HISTORY OF OTHER DISEASES OF THE DIGESTIVE SYSTEM: Chronic | Status: ACTIVE | Noted: 2024-01-26

## 2024-02-20 PROBLEM — R10.13 EPIGASTRIC PAIN: Chronic | Status: ACTIVE | Noted: 2024-01-26

## 2024-02-20 PROBLEM — T14.8XXA OTHER INJURY OF UNSPECIFIED BODY REGION, INITIAL ENCOUNTER: Chronic | Status: ACTIVE | Noted: 2024-01-26

## 2024-02-21 ENCOUNTER — APPOINTMENT (OUTPATIENT)
Dept: ORTHOPEDIC SURGERY | Facility: CLINIC | Age: 74
End: 2024-02-21
Payer: MEDICARE

## 2024-02-21 VITALS
HEIGHT: 75 IN | HEART RATE: 68 BPM | OXYGEN SATURATION: 99 % | SYSTOLIC BLOOD PRESSURE: 128 MMHG | WEIGHT: 215 LBS | DIASTOLIC BLOOD PRESSURE: 81 MMHG | BODY MASS INDEX: 26.73 KG/M2

## 2024-02-21 PROCEDURE — 99024 POSTOP FOLLOW-UP VISIT: CPT

## 2024-02-21 NOTE — END OF VISIT
[FreeTextEntry3] : All medical record entries made by the Scribe were at my, Dr. Wilson Gaston's, direction and personally dictated by me on 02/21/2024. I have reviewed the chart and agree that the record accurately reflects my personal performance of the history, physical exam, assessment and plan. I have also personally directed, reviewed, and agreed with the chart.

## 2024-02-21 NOTE — HISTORY OF PRESENT ILLNESS
[de-identified] : 1st post op for RT HIP REPLACEMENT- 01/29/2024 [de-identified] : 2/21/2024 72 y/o M now about three weeks status post, right ALFRED doing very well. Denies having any pain, taking Tylenol as needed. He's been active with the home physical therapy and nursing services. He states he's been ambulating well with the cane, but at this time feels that he does not need it. He denies any fever, chills, or systemic symptoms of infection or drainage from the wound. Overall patient is doing well with no complaints today. [de-identified] : General appearance: well nourished and hydrated, pleasant, alert and oriented x 3, cooperative. HEENT: normocephalic, EOM intact, wearing mask, external auditory canal clear. Cardiovascular: no lower leg edema, no varicosities, dorsalis pedis pulses palpable and symmetric. Lymphatics: no palpable lymphadenopathy, no lymphedema. Neurologic: sensation is normal, no muscle weakness in upper or lower extremities, patella tendon reflexes present and symmetric. Dermatologic: skin moist, warm, no rash. Spine: cervical spine with normal lordosis and painless range of motion, thoracic spine with normal kyphosis and painless range of motion, lumbosacral spine with normal lordosis and painless range of motion. No tenderness to palpation along midline spine and paraspinal musculature. Sacroiliac joints nontender bilaterally. Negative SLR and crossed SLR tests bilaterally. Gait: Presents without the use of an assistive device, normal gait pattern.   Limb lengths: clinically equal   Right hip: - Focal soft tissue swelling: none - Ecchymosis: none - Erythema: none - Wounds: well healed anterior surgical incision, benign appearing with distal and proximal scabbing. - Tenderness: none [de-identified] : 2/21/2024 74 y/o M now about 3 weeks S/P right ALFRED doing very well. - We'll continue to follow up in about four weeks with right hip x-rays. - I will provide patient with a physical therapy script as well as a home exercise program. - I encouraged the patient to continue Tylenol as needed for pain.

## 2024-03-20 ENCOUNTER — RESULT REVIEW (OUTPATIENT)
Age: 74
End: 2024-03-20

## 2024-03-20 ENCOUNTER — APPOINTMENT (OUTPATIENT)
Dept: ORTHOPEDIC SURGERY | Facility: CLINIC | Age: 74
End: 2024-03-20
Payer: MEDICARE

## 2024-03-20 ENCOUNTER — OUTPATIENT (OUTPATIENT)
Dept: OUTPATIENT SERVICES | Facility: HOSPITAL | Age: 74
LOS: 1 days | End: 2024-03-20
Payer: MEDICARE

## 2024-03-20 VITALS
HEART RATE: 68 BPM | HEIGHT: 75 IN | OXYGEN SATURATION: 97 % | WEIGHT: 215 LBS | SYSTOLIC BLOOD PRESSURE: 143 MMHG | DIASTOLIC BLOOD PRESSURE: 80 MMHG | BODY MASS INDEX: 26.73 KG/M2

## 2024-03-20 PROCEDURE — 73502 X-RAY EXAM HIP UNI 2-3 VIEWS: CPT | Mod: 26,RT

## 2024-03-20 PROCEDURE — 73502 X-RAY EXAM HIP UNI 2-3 VIEWS: CPT

## 2024-03-20 PROCEDURE — 99024 POSTOP FOLLOW-UP VISIT: CPT

## 2024-03-20 RX ORDER — AMOXICILLIN 500 MG/1
500 TABLET, FILM COATED ORAL
Qty: 8 | Refills: 2 | Status: ACTIVE | COMMUNITY
Start: 2024-03-20 | End: 1900-01-01

## 2024-03-25 NOTE — DISCUSSION/SUMMARY
[de-identified] : 03/20/2024: 73 year old male now approx. 7 weeks status post right total hip replacement. Overall doing well however with some ongoing right hip weakness of both flexion and abduction.  -I recommended that he begin with outpatient physical therapy with a predominant emphasis on hip strengthening and reduction of reliance on ambulatory assist devices.  -I did encourage him to wean off the use of his cane as quickly as possible. -He will follow up next in 2 months with repeat right hip x-rays  -We will also provide him with a letter specifying our dental prophylaxis requirements per his request.

## 2024-03-25 NOTE — END OF VISIT
[FreeTextEntry3] : All medical record entries made by the Scribe were at my, Dr. Wilson Gaston's, direction and personally dictated by me on 03/20/2024. I have reviewed the chart and agree that the record accurately reflects my personal performance of the history, physical exam, assessment and plan. I have also personally directed, reviewed, and agreed with the chart.

## 2024-03-25 NOTE — PHYSICAL EXAM
[de-identified] : 03/20/2024:  General appearance: well nourished and hydrated, pleasant, alert and oriented x 3, cooperative. HEENT: normocephalic, EOM intact, wearing mask, external auditory canal clear. Cardiovascular: no lower leg edema, no varicosities, dorsalis pedis pulses palpable and symmetric. Lymphatics: no palpable lymphadenopathy, no lymphedema. Neurologic: sensation is normal, no muscle weakness in upper or lower extremities, patella tendon reflexes present and symmetric. Dermatologic: skin moist, warm, no rash. Spine: cervical spine with normal lordosis and painless range of motion, thoracic spine with normal kyphosis and painless range of motion, lumbosacral spine with normal lordosis and painless range of motion. No tenderness to palpation along midline spine and paraspinal musculature. Sacroiliac joints nontender bilaterally. Negative SLR and crossed SLR tests bilaterally. Gait: he does present today with a cane. Without the cane, he continues to demonstrate a mild right sided caution though no antalgic. He has a negative Tendelenburg sign.    Right hip: - Focal soft tissue swelling: none - Ecchymosis: none - Erythema: none - Wounds: well healed anterior incision with slight scabbing while proximally and viscerally benign appearing. - Tenderness: none - ROM: - Flexion:90 - Extension:0 - Adduction:10 - Abduction:30 - Internal rotation in 90 degrees of hip flexion:5 - External rotation in 90 degrees of hip flexion:30 - BUZZ: negative - FADIR: negative - Elise: negative - [facility]: negative - Flexor power:4+/5 - Abductor power:4+/5 [de-identified] : 03/20/2024: Imaging findings today, AP pelvis and right hip radiographs were obtained today March 20th, 2024 at [facility]. These demonstrate stable appearance of his right total hip arthroplasty as compared to prior imaging w/o evidence of mechanical complication. Stable appearance noted to mild left hip osteoarthritis with no associated deformity or osteonecrosis.

## 2024-03-25 NOTE — HISTORY OF PRESENT ILLNESS
[de-identified] : 03/20/2024: 73 year old male following up for a second post-op visit, right total hip replacement. He reports that he's doing very well with no pain. He never began physical therapy outpatient because he said he opted to just resume working instead which he considers to be a relatively physical job. However, he does report ongoing fatigue and some weakness of the right hip for which he still occasionally uses a cane and he does have a cane with him today.    12/13/2023 74 y/o M following up for right greater than left hip osteoarthritis. We last saw him three months ago at which time we indicated him for right total hip arthroplasty. He's here today to finalize his surgical plans. He reports no significant change in the symptoms. He does feel like the right hip is continuing to get worse over time. 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 He is currently on medical management for it and he does feel that it is gradually improving over time. No surgery has been recommended by the colorectal team and he does report that he is having regular bowel movements without too much difficulty using a high fiber diet.  09/13/2023: 73 y/o male following up for right hip osteoarthritis. We last saw him about a year and a half ago at which time he noted that right hip pain was relatively mild and well controlled. Over the course of the last year, he has been focusing mainly on his prostate cancer treatment and he's happy to report that his PSA has been steadily downtrending. However, he has noted persistently worsening right hip pain. Whereas previously he did not use any pain medications at all, he is now using Tylenol and Meloxicam on a daily basis. He does find these to be helpful at relieving the pain. He has been walking for exercise and has resumed work as a [employer] , which he notes involved standing as much as 4 hours per day indoors and outdoors. He notes minimal to no left hip discomfort.  5/11/22: Reports that the right hip is still minimally painful but he still experiencing relative weakness and stiffness particularly when rising from seated position. Has no limitations with regard to ambulatory tolerance or day to day activities; never needs assistive device. Has been using Tylenol and meloxicam as needed. Is undergoing workup for possible prostate CA and is prioritizing that right now.  1/31/22: Reports about 80-90% pain relief from right hip CSI on 12/1/21. Still doing very well; currently only has a bit of pain with initiation of movement that resolves after a few steps. Discontinued PT but is still doing HEP.   5/6/21: 69y/o male presenting for evaluation of progressive right hip pain and stiffness since Oct 2020. No injury. Symptoms occur daily but intensity is variable. Has been taking Tylenol with slight relief. Started PT three weeks ago, has had about a half dozen sessions so far; can't tell if it's helping. Has never had injections or surgery. Feels like the right leg is longer than the left. Exercise tolerance remains unlimited; thinks he can walk a mile without cane, though would expect to be stiff and sore by the end. Retired from employment at [employer]. No PMH other than diet-controlled HLD. He is here to discuss ALFRED.  He is the  of [employer].

## 2024-05-15 ENCOUNTER — APPOINTMENT (OUTPATIENT)
Dept: ORTHOPEDIC SURGERY | Facility: CLINIC | Age: 74
End: 2024-05-15
Payer: MEDICARE

## 2024-05-15 ENCOUNTER — OUTPATIENT (OUTPATIENT)
Dept: OUTPATIENT SERVICES | Facility: HOSPITAL | Age: 74
LOS: 1 days | End: 2024-05-15
Payer: MEDICARE

## 2024-05-15 ENCOUNTER — RESULT REVIEW (OUTPATIENT)
Age: 74
End: 2024-05-15

## 2024-05-15 VITALS
BODY MASS INDEX: 26.73 KG/M2 | WEIGHT: 215 LBS | DIASTOLIC BLOOD PRESSURE: 76 MMHG | OXYGEN SATURATION: 98 % | HEART RATE: 56 BPM | HEIGHT: 75 IN | SYSTOLIC BLOOD PRESSURE: 118 MMHG

## 2024-05-15 DIAGNOSIS — Z98.890 OTHER SPECIFIED POSTPROCEDURAL STATES: Chronic | ICD-10-CM

## 2024-05-15 DIAGNOSIS — Z47.1 AFTERCARE FOLLOWING JOINT REPLACEMENT SURGERY: ICD-10-CM

## 2024-05-15 DIAGNOSIS — M16.11 UNILATERAL PRIMARY OSTEOARTHRITIS, RIGHT HIP: ICD-10-CM

## 2024-05-15 DIAGNOSIS — Z96.641 AFTERCARE FOLLOWING JOINT REPLACEMENT SURGERY: ICD-10-CM

## 2024-05-15 PROCEDURE — 73502 X-RAY EXAM HIP UNI 2-3 VIEWS: CPT | Mod: 26,RT

## 2024-05-15 PROCEDURE — 73502 X-RAY EXAM HIP UNI 2-3 VIEWS: CPT

## 2024-05-15 PROCEDURE — 99213 OFFICE O/P EST LOW 20 MIN: CPT

## 2024-05-17 NOTE — DISCUSSION/SUMMARY
[de-identified] : IMP: 73 year old male 3.5 months s/p right total hip arthroplasty doing well at this time  - He will continue with HEP,  - I encouraged him to ambulate with a target of 10,000 steps per day, - He will follow up anually every January with repeat right hip XRs, sooner as needed for any new or worsening symptoms,

## 2024-05-17 NOTE — PHYSICAL EXAM
[de-identified] : General appearance: well nourished and hydrated, pleasant, alert and oriented x 3, cooperative. HEENT: normocephalic, EOM intact, wearing mask, external auditory canal clear. Cardiovascular: no lower leg edema, no varicosities, dorsalis pedis pulses palpable and symmetric. Lymphatics: no palpable lymphadenopathy, no lymphedema. Neurologic: sensation is normal, no muscle weakness in upper or lower extremities, patella tendon reflexes present and symmetric. Dermatologic: skin moist, warm, no rash. Spine: cervical spine with normal lordosis and painless range of motion, thoracic spine with normal kyphosis and painless range of motion, lumbosacral spine with normal lordosis and painless range of motion.  No tenderness to palpation along midline spine and paraspinal musculature.  Sacroiliac joints nontender bilaterally. Negative SLR and crossed SLR tests bilaterally. Gait: Demonstrates a stable nonantalgic gait pattern without the use of an assistive device.  Limb lengths: clinically right lower extremity 2 mm shorter than left    Right hip: - Focal soft tissue swelling: none - Ecchymosis: none - Erythema: none - Wounds: well-healed anterior incision, benign appearing - Tenderness: Slight lateral hip and thigh numbness - ROM:   - Flexion: 110   - Extension: 0   - Adduction: 15   - Abduction: 45   - Internal rotation in 90 degrees of hip flexion: 15   - External rotation in 90 degrees of hip flexion: 45 - BUZZ: negative - FADIR: negative - Elise: negative - [place]: negative - Flexor power: 5/5 - Abductor power: 5/5 [de-identified] : Right hip XRs were taken today, 05/15/2024, at [facility] which demonstrated: - Stable appearance of a right total hip arthroplasty as compared to prior imaging without evidence of mechanical complication.

## 2024-05-17 NOTE — END OF VISIT
[FreeTextEntry3] : All medical record entries made by the Scribe were at my, Dr. Wilson Gaston, direction and personally dictated by me on 05/15/2024. I have reviewed the chart and agree that the record accurately reflects my personal performance of the history, physical exam, assessment and plan. I have alsopersonally directed, reviewed, and agreed with the chart.

## 2024-05-17 NOTE — HISTORY OF PRESENT ILLNESS
[de-identified] : Right total hip arthroplasty 01/29/2024  05/15/2024: 73 year old male presenting for follow up evaluation of 3.5 s/p right total hip arthroplasty. This is his thirs post operative visit. The symproms he is having are consistent with right hip flexor tendinitis. He is participating in outpatient PT and HEP.  He reports his symptoms at this point are negligible with maximum pain level a 1/10. He has been ambulating without the use of an assistive device and has no new complaints today.   03/20/2024: 73 year old male following up for a second post-op visit, right total hip replacement. He reports that he's doing very well with no pain. He never began physical therapy outpatient because he said he opted to just resume working instead which he considers to be a relatively physical job. However, he does report ongoing fatigue and some weakness of the right hip for which he still occasionally uses a cane and he does have a cane with him today.   12/13/2023 72 y/o M following up for right greater than left hip osteoarthritis. We last saw him three months ago at which time we indicated him for right total hip arthroplasty. He's here today to finalize his surgical plans. He reports no significant change in the symptoms. He does feel like the right hip is continuing to get worse over time. 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 He is currently on medical management for it and he does feel that it is gradually improving over time. No surgery has been recommended by the colorectal team and he does report that he is having regular bowel movements without too much difficulty using a high fiber diet.  09/13/2023: 71 y/o male following up for right hip osteoarthritis. We last saw him about a year and a half ago at which time he noted that right hip pain was relatively mild and well controlled. Over the course of the last year, he has been focusing mainly on his prostate cancer treatment and he's happy to report that his PSA has been steadily downtrending. However, he has noted persistently worsening right hip pain. Whereas previously he did not use any pain medications at all, he is now using Tylenol and Meloxicam on a daily basis. He does find these to be helpful at relieving the pain. He has been walking for exercise and has resumed work as a [employer] , which he notes involved standing as much as 4 hours per day indoors and outdoors. He notes minimal to no left hip discomfort.  5/11/22: Reports that the right hip is still minimally painful but he still experiencing relative weakness and stiffness particularly when rising from seated position. Has no limitations with regard to ambulatory tolerance or day to day activities; never needs assistive device. Has been using Tylenol and meloxicam as needed. Is undergoing workup for possible prostate CA and is prioritizing that right now.  1/31/22: Reports about 80-90% pain relief from right hip CSI on 12/1/21. Still doing very well; currently only has a bit of pain with initiation of movement that resolves after a few steps. Discontinued PT but is still doing HEP.   5/6/21: 69y/o male presenting for evaluation of progressive right hip pain and stiffness since Oct 2020. No injury. Symptoms occur daily but intensity is variable. Has been taking Tylenol with slight relief. Started PT three weeks ago, has had about a half dozen sessions so far; can't tell if it's helping. Has never had injections or surgery. Feels like the right leg is longer than the left. Exercise tolerance remains unlimited; thinks he can walk a mile without cane, though would expect to be stiff and sore by the end. Retired from employment at [employer]. No PMH other than diet-controlled HLD. He is here to discuss ALFRED.  He is the  of [employer].

## 2024-05-17 NOTE — ADDENDUM
[FreeTextEntry1] : I, Carmen Camara, documented this note as a scribe on behalf of Dr. Wilson Gaston on 05/15/2024.

## 2024-05-21 ENCOUNTER — APPOINTMENT (OUTPATIENT)
Dept: SURGERY | Facility: CLINIC | Age: 74
End: 2024-05-21
Payer: MEDICARE

## 2024-05-21 VITALS
BODY MASS INDEX: 26.73 KG/M2 | HEIGHT: 75 IN | DIASTOLIC BLOOD PRESSURE: 70 MMHG | RESPIRATION RATE: 16 BRPM | OXYGEN SATURATION: 98 % | WEIGHT: 215 LBS | SYSTOLIC BLOOD PRESSURE: 112 MMHG | HEART RATE: 72 BPM

## 2024-05-21 PROCEDURE — 99213 OFFICE O/P EST LOW 20 MIN: CPT

## 2024-05-21 NOTE — PHYSICAL EXAM
[Exam Deferred] : exam was deferred [JVD] : no jugular venous distention  [Normal Breath Sounds] : Normal breath sounds [Wheezing] : no wheezing was heard [Normal Heart Sounds] : normal heart sounds [Normal Rate and Rhythm] : normal rate and rhythm [No Rash or Lesion] : No rash or lesion [Alert] : alert [Oriented to Person] : oriented to person [Oriented to Place] : oriented to place [Oriented to Time] : oriented to time [de-identified] : soft, non-distended, non-tender to palpation.  [de-identified] : Small AM skintag. [de-identified] : awake, alert, NAD  [de-identified] : normocephalic, atraumatic, EOMI, normal conjunctiva  [de-identified] : deferred [de-identified] : Normal strength  [de-identified] : normal mood and affect

## 2024-05-21 NOTE — HISTORY OF PRESENT ILLNESS
[FreeTextEntry1] : Mr. MORALES CLEMONS is a 73y.o. M with a history of prostate cancer (s/p seeds RT and hormone therapy), osteoarthritis, and hemorrhoids referred by Dr. Eric for anal pain here for a follow-up visit. He's had hemorrhoids in the past which come and go. However, a couple of weeks ago, he developed sharp anal pain which occurred during and after a BM. Per patient, pain was aggravated by radiation (and Nov/Dec 2022) and more recently with hormone injections. He does report that on reflection his BMs might have been harder for a short period after the injections. Anal pain has improved and is not noted during a BM but still experiences burning afterwards. He has been using hydrocortisone 2-3 times a day, sitz baths once daily and prep H suppositories which help with pain. He has daily soft BM without straining but never feels like he is finished after a BM. He reports he rarely sees bleeding. Denies taking fiber supplement, laxatives or stool softeners. Per patient, last colonoscopy in 2017 was normal. Patient has a colonoscopy scheduled for 8/17/2023. 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 Today he reports that he is doing great. No more pain and no blood with BMs. He has occasional mild pain whenever he eats certain things that makes his stools harder than usual. But overall he has increased fiber in his diet and also takes metamucil pills daily.

## 2024-05-21 NOTE — ASSESSMENT
[FreeTextEntry1] : Mr. MORALES CLEMONS is a 73 year M presenting with anal pain most likely due to an anal fissure.

## 2024-05-21 NOTE — PLAN
[TextEntry] : - Pt is doing well from the anal fissure standpoint - Recommend continuing high fiber diet and fiber supplement - Pt states his nifedipine is almost out and he wanted some refills on it. New script sent to [facility] today (confirmed his [place] address is the correct one) - Recommend following up with Dr. Eric for his colonoscopy. Was due last yr but didn't have it. Encouraged him to have it done this year - Pt knows I am leaving [place] but he can follow-up with Dr. Desir, Dr. Hernandez or Dr. Delgadillo moving forward if he has any issues - RTC prn

## 2024-07-15 ENCOUNTER — NON-APPOINTMENT (OUTPATIENT)
Age: 74
End: 2024-07-15

## 2024-08-30 NOTE — DISCHARGE NOTE ADULT - NS AS DC STROKE DX YN
Pt presents from home for evaluation of AMS starting at 1100 8/29.  No focal neurological deficits.   Baseline:patient able todo all chores around the house yesterday. Able to drive, cook, clean himself, pick out clothes  CTA head/ neck with no evidence of intercranial hemorrhage, Severe chronic microvascular ischemic change and superimposed chronic infarcts as detailed above. Age-indeterminate right temporal infarct, likely chronic.   EKG sinus without acute ischemic changes  CXR on my review without large infiltrate or effusion awaiting official read  MRI pending  Neurology onboard  Follow stroke pathway  Monitor on telemetry  Placed on ASA and high intensity statin  Neurology consulted  Fall and delirium precautions  PT/OT, case management for dispo planning   no

## 2024-11-20 ENCOUNTER — APPOINTMENT (OUTPATIENT)
Dept: UROLOGY | Facility: CLINIC | Age: 74
End: 2024-11-20
Payer: MEDICARE

## 2024-11-20 VITALS
WEIGHT: 232 LBS | OXYGEN SATURATION: 98 % | HEIGHT: 75 IN | BODY MASS INDEX: 28.85 KG/M2 | HEART RATE: 57 BPM | SYSTOLIC BLOOD PRESSURE: 125 MMHG | TEMPERATURE: 97.3 F | DIASTOLIC BLOOD PRESSURE: 84 MMHG

## 2024-11-20 DIAGNOSIS — N52.9 MALE ERECTILE DYSFUNCTION, UNSPECIFIED: ICD-10-CM

## 2024-11-20 DIAGNOSIS — C61 MALIGNANT NEOPLASM OF PROSTATE: ICD-10-CM

## 2024-11-20 PROCEDURE — G2211 COMPLEX E/M VISIT ADD ON: CPT

## 2024-11-20 PROCEDURE — 99214 OFFICE O/P EST MOD 30 MIN: CPT

## 2024-11-20 RX ORDER — SILDENAFIL 100 MG/1
100 TABLET, FILM COATED ORAL
Qty: 30 | Refills: 0 | Status: ACTIVE | COMMUNITY
Start: 2024-11-20 | End: 1900-01-01

## 2024-11-25 RX ORDER — ATORVASTATIN CALCIUM 10 MG/1
10 TABLET, FILM COATED ORAL
Refills: 0 | Status: ACTIVE | COMMUNITY

## 2025-01-15 ENCOUNTER — APPOINTMENT (OUTPATIENT)
Dept: ORTHOPEDIC SURGERY | Facility: CLINIC | Age: 75
End: 2025-01-15

## 2025-03-06 NOTE — PHYSICAL THERAPY INITIAL EVALUATION ADULT - PLANNED THERAPY INTERVENTIONS, PT EVAL
Addended by: ERIK ROBLEDO on: 3/6/2025 12:55 PM     Modules accepted: Orders    
balance training/bed mobility training/gait training/postural re-education/strengthening/transfer training

## 2025-07-16 ENCOUNTER — NON-APPOINTMENT (OUTPATIENT)
Age: 75
End: 2025-07-16

## 2025-07-16 ENCOUNTER — APPOINTMENT (OUTPATIENT)
Dept: UROLOGY | Facility: CLINIC | Age: 75
End: 2025-07-16
Payer: MEDICARE

## 2025-07-16 VITALS
BODY MASS INDEX: 28.6 KG/M2 | DIASTOLIC BLOOD PRESSURE: 83 MMHG | HEART RATE: 66 BPM | WEIGHT: 230 LBS | TEMPERATURE: 97.2 F | HEIGHT: 75 IN | SYSTOLIC BLOOD PRESSURE: 148 MMHG | OXYGEN SATURATION: 97 %

## 2025-07-16 PROBLEM — R31.29 HEMATURIA, MICROSCOPIC: Status: ACTIVE | Noted: 2025-07-16

## 2025-07-16 PROCEDURE — 99214 OFFICE O/P EST MOD 30 MIN: CPT

## 2025-07-16 PROCEDURE — G2211 COMPLEX E/M VISIT ADD ON: CPT

## 2025-07-18 LAB
APPEARANCE: CLEAR
BACTERIA: NEGATIVE /HPF
BILIRUBIN URINE: NEGATIVE
BLOOD URINE: NEGATIVE
CAST: 0 /LPF
COLOR: YELLOW
EPITHELIAL CELLS: 0 /HPF
GLUCOSE QUALITATIVE U: NEGATIVE MG/DL
KETONES URINE: ABNORMAL MG/DL
LEUKOCYTE ESTERASE URINE: NEGATIVE
MICROSCOPIC-UA: NORMAL
NITRITE URINE: NEGATIVE
PH URINE: 6
PROTEIN URINE: NEGATIVE MG/DL
RED BLOOD CELLS URINE: 1 /HPF
SPECIFIC GRAVITY URINE: 1.02
UROBILINOGEN URINE: 0.2 MG/DL
WHITE BLOOD CELLS URINE: 0 /HPF

## 2025-09-09 ENCOUNTER — RESULT REVIEW (OUTPATIENT)
Age: 75
End: 2025-09-09

## 2025-09-09 ENCOUNTER — APPOINTMENT (OUTPATIENT)
Dept: ORTHOPEDIC SURGERY | Facility: CLINIC | Age: 75
End: 2025-09-09
Payer: MEDICARE

## 2025-09-09 VITALS
HEIGHT: 75 IN | DIASTOLIC BLOOD PRESSURE: 83 MMHG | HEART RATE: 63 BPM | BODY MASS INDEX: 28.6 KG/M2 | SYSTOLIC BLOOD PRESSURE: 133 MMHG | WEIGHT: 230 LBS | OXYGEN SATURATION: 96 % | TEMPERATURE: 97.6 F

## 2025-09-09 DIAGNOSIS — Z96.641 AFTERCARE FOLLOWING JOINT REPLACEMENT SURGERY: ICD-10-CM

## 2025-09-09 DIAGNOSIS — Z47.1 AFTERCARE FOLLOWING JOINT REPLACEMENT SURGERY: ICD-10-CM

## 2025-09-09 PROCEDURE — 99213 OFFICE O/P EST LOW 20 MIN: CPT

## (undated) DEVICE — SUT STRATAFIX SPIRAL PDS PLUS 2-0 36X36CM CT-1 VIOLET

## (undated) DEVICE — GLV 7.5 PROTEXIS ORTHO (CREAM)

## (undated) DEVICE — GLV 8 PROTEXIS (WHITE)

## (undated) DEVICE — DRAPE 3/4 SHEET 52X76"

## (undated) DEVICE — SUT ETHIBOND 1 30" OS8

## (undated) DEVICE — SUT STRATAFIX SPIRAL PDS PLUS 0 23X23CM CP-2 VIOLET

## (undated) DEVICE — SAW BLADE STRYKER SAGITTAL 81.5X12.5X1.19MM

## (undated) DEVICE — DRSG PREVENA PEEL & PLACE KIT 13CM

## (undated) DEVICE — SUT STRATAFIX SPIRAL PDO 1 30CM OS-6

## (undated) DEVICE — VAGINAL PACKING 2"

## (undated) DEVICE — DRAPE LIGHT HANDLE COVER (BLUE)

## (undated) DEVICE — SUT VICRYL 0 36" CT-1 UNDYED

## (undated) DEVICE — SPECIMEN CONTAINER 4OZ

## (undated) DEVICE — SUT STRATAFIX SPIRAL MONOCRYL PLUS 3-0 30CM PS-1 UNDYED

## (undated) DEVICE — TIP FEMORAL CANAL

## (undated) DEVICE — WOUND IRR IRRISEPT W 0.5 CHG

## (undated) DEVICE — DRSG DERMABOND PRINEO 22CM

## (undated) DEVICE — WARMING BLANKET UPPER ADULT

## (undated) DEVICE — DRAPE LIGHT HANDLE COVER (GREEN)

## (undated) DEVICE — ELCTR BOVIE PENCIL BLADE 10FT

## (undated) DEVICE — DRAPE SUPINE ESYSUIT

## (undated) DEVICE — DRSG AQUACEL 3.5 X 10"

## (undated) DEVICE — HOOD T5 PEELAWAY

## (undated) DEVICE — PREP CHLORAPREP HI-LITE ORANGE 26ML

## (undated) DEVICE — PACK TOTAL HIP (2 PACKS)

## (undated) DEVICE — DRAPE C ARM 41X74"

## (undated) DEVICE — HOOD FLYTE STRYKER HELMET SHIELD

## (undated) DEVICE — GLV 7 PROTEXIS (WHITE)

## (undated) DEVICE — NDL HYPO SAFE 22G X 1.5" (BLACK)

## (undated) DEVICE — Device

## (undated) DEVICE — HOOD T7 PLUS PEELAWAY

## (undated) DEVICE — PREP DURAPREP 26CC

## (undated) DEVICE — DRAPE TOWEL BLUE 17" X 24"

## (undated) DEVICE — ELCTR AQUAMANTYS BIPOLAR SEALER 6.0

## (undated) DEVICE — DRSG COBAN 6"

## (undated) DEVICE — SUT VICRYL 2-0 27" CT-1 UNDYED

## (undated) DEVICE — ELCTR STRYKER NEPTUNE SMOKE EVACUATION PENCIL (GREEN)

## (undated) DEVICE — WOUND IRR SURGIPHOR

## (undated) DEVICE — GLV 7 PROTEXIS ORTHO (CREAM)

## (undated) DEVICE — GLV 7.5 PROTEXIS (WHITE)

## (undated) DEVICE — PRESSURIZER CMT W/O HUB SM